# Patient Record
Sex: FEMALE | Race: WHITE | Employment: OTHER | URBAN - METROPOLITAN AREA
[De-identification: names, ages, dates, MRNs, and addresses within clinical notes are randomized per-mention and may not be internally consistent; named-entity substitution may affect disease eponyms.]

---

## 2017-01-03 DIAGNOSIS — Z79.899 ON AMIODARONE THERAPY: Primary | ICD-10-CM

## 2017-01-11 ENCOUNTER — PROCEDURE VISIT (OUTPATIENT)
Dept: CARDIOLOGY CLINIC | Age: 82
End: 2017-01-11

## 2017-01-11 ENCOUNTER — OFFICE VISIT (OUTPATIENT)
Dept: CARDIOLOGY CLINIC | Age: 82
End: 2017-01-11

## 2017-01-11 VITALS
BODY MASS INDEX: 24.45 KG/M2 | HEIGHT: 63 IN | SYSTOLIC BLOOD PRESSURE: 147 MMHG | HEART RATE: 90 BPM | WEIGHT: 138 LBS | DIASTOLIC BLOOD PRESSURE: 87 MMHG

## 2017-01-11 DIAGNOSIS — I35.0 NONRHEUMATIC AORTIC VALVE STENOSIS: ICD-10-CM

## 2017-01-11 DIAGNOSIS — I47.20 VENTRICULAR TACHYCARDIA: ICD-10-CM

## 2017-01-11 DIAGNOSIS — I42.1 HOCM (HYPERTROPHIC OBSTRUCTIVE CARDIOMYOPATHY) (HCC): ICD-10-CM

## 2017-01-11 DIAGNOSIS — Z95.810 CARDIAC DEFIBRILLATOR IN PLACE: Primary | ICD-10-CM

## 2017-01-11 DIAGNOSIS — R55 SYNCOPE AND COLLAPSE: Primary | ICD-10-CM

## 2017-01-11 DIAGNOSIS — R55 SYNCOPE AND COLLAPSE: ICD-10-CM

## 2017-01-11 PROCEDURE — 99214 OFFICE O/P EST MOD 30 MIN: CPT | Performed by: INTERNAL MEDICINE

## 2017-01-11 PROCEDURE — 93283 PRGRMG EVAL IMPLANTABLE DFB: CPT | Performed by: INTERNAL MEDICINE

## 2017-02-03 ENCOUNTER — OFFICE VISIT (OUTPATIENT)
Dept: CARDIOLOGY CLINIC | Age: 82
End: 2017-02-03

## 2017-02-03 VITALS — SYSTOLIC BLOOD PRESSURE: 150 MMHG | OXYGEN SATURATION: 96 % | HEART RATE: 85 BPM | DIASTOLIC BLOOD PRESSURE: 90 MMHG

## 2017-02-03 DIAGNOSIS — Z95.810 CARDIAC DEFIBRILLATOR IN PLACE: ICD-10-CM

## 2017-02-03 DIAGNOSIS — I35.0 NONRHEUMATIC AORTIC VALVE STENOSIS: ICD-10-CM

## 2017-02-03 DIAGNOSIS — I11.0 HYPERTENSIVE HEART DISEASE WITH HEART FAILURE (HCC): ICD-10-CM

## 2017-02-03 DIAGNOSIS — I42.1 HOCM (HYPERTROPHIC OBSTRUCTIVE CARDIOMYOPATHY) (HCC): Primary | ICD-10-CM

## 2017-02-03 PROCEDURE — 1036F TOBACCO NON-USER: CPT | Performed by: INTERNAL MEDICINE

## 2017-02-03 PROCEDURE — G8400 PT W/DXA NO RESULTS DOC: HCPCS | Performed by: INTERNAL MEDICINE

## 2017-02-03 PROCEDURE — G8427 DOCREV CUR MEDS BY ELIG CLIN: HCPCS | Performed by: INTERNAL MEDICINE

## 2017-02-03 PROCEDURE — 1090F PRES/ABSN URINE INCON ASSESS: CPT | Performed by: INTERNAL MEDICINE

## 2017-02-03 PROCEDURE — G8420 CALC BMI NORM PARAMETERS: HCPCS | Performed by: INTERNAL MEDICINE

## 2017-02-03 PROCEDURE — 99214 OFFICE O/P EST MOD 30 MIN: CPT | Performed by: INTERNAL MEDICINE

## 2017-02-03 PROCEDURE — 1123F ACP DISCUSS/DSCN MKR DOCD: CPT | Performed by: INTERNAL MEDICINE

## 2017-02-03 PROCEDURE — 4040F PNEUMOC VAC/ADMIN/RCVD: CPT | Performed by: INTERNAL MEDICINE

## 2017-02-03 PROCEDURE — G8484 FLU IMMUNIZE NO ADMIN: HCPCS | Performed by: INTERNAL MEDICINE

## 2017-02-03 RX ORDER — METOPROLOL TARTRATE 50 MG/1
75 TABLET, FILM COATED ORAL 2 TIMES DAILY
Qty: 60 TABLET | Refills: 3 | Status: SHIPPED | OUTPATIENT
Start: 2017-02-03 | End: 2017-05-23 | Stop reason: SDUPTHER

## 2017-02-03 RX ORDER — LISINOPRIL 2.5 MG/1
2.5 TABLET ORAL DAILY
Qty: 90 TABLET | Refills: 3 | Status: SHIPPED | OUTPATIENT
Start: 2017-02-03 | End: 2018-01-10 | Stop reason: SDUPTHER

## 2017-02-22 RX ORDER — ASPIRIN 81 MG
TABLET, DELAYED RELEASE (ENTERIC COATED) ORAL
Qty: 30 TABLET | Refills: 5 | Status: SHIPPED | OUTPATIENT
Start: 2017-02-22 | End: 2017-08-14 | Stop reason: SDUPTHER

## 2017-03-28 LAB
ALBUMIN SERPL-MCNC: 4 G/DL
ALP BLD-CCNC: 107 U/L
ALT SERPL-CCNC: 27 U/L
AST SERPL-CCNC: 25 U/L
BILIRUB SERPL-MCNC: 0.7 MG/DL (ref 0.1–1.4)
BUN BLDV-MCNC: 20 MG/DL
CALCIUM SERPL-MCNC: 9.3 MG/DL
CHLORIDE BLD-SCNC: 100 MMOL/L
CO2: 26 MMOL/L
CREAT SERPL-MCNC: 1.02 MG/DL
GFR CALCULATED: NORMAL
GLUCOSE BLD-MCNC: 77 MG/DL
POTASSIUM SERPL-SCNC: 4.4 MMOL/L
SODIUM BLD-SCNC: 137 MMOL/L
TOTAL PROTEIN: 6.6
TSH SERPL DL<=0.05 MIU/L-ACNC: 2.24 UIU/ML

## 2017-05-09 ENCOUNTER — NURSE ONLY (OUTPATIENT)
Dept: CARDIOLOGY CLINIC | Age: 82
End: 2017-05-09

## 2017-05-09 DIAGNOSIS — Z95.810 CARDIAC DEFIBRILLATOR IN PLACE: Primary | ICD-10-CM

## 2017-05-09 DIAGNOSIS — I47.20 VENTRICULAR TACHYCARDIA: ICD-10-CM

## 2017-05-09 PROCEDURE — 93295 DEV INTERROG REMOTE 1/2/MLT: CPT | Performed by: INTERNAL MEDICINE

## 2017-05-09 PROCEDURE — 93296 REM INTERROG EVL PM/IDS: CPT | Performed by: INTERNAL MEDICINE

## 2017-05-23 RX ORDER — METOPROLOL TARTRATE 50 MG/1
TABLET, FILM COATED ORAL
Qty: 90 TABLET | Refills: 2 | Status: SHIPPED | OUTPATIENT
Start: 2017-05-23 | End: 2017-08-14 | Stop reason: SDUPTHER

## 2017-06-02 ENCOUNTER — OFFICE VISIT (OUTPATIENT)
Dept: CARDIOLOGY CLINIC | Age: 82
End: 2017-06-02

## 2017-06-02 VITALS
BODY MASS INDEX: 25.69 KG/M2 | OXYGEN SATURATION: 95 % | HEIGHT: 63 IN | HEART RATE: 84 BPM | SYSTOLIC BLOOD PRESSURE: 130 MMHG | DIASTOLIC BLOOD PRESSURE: 84 MMHG | WEIGHT: 145 LBS

## 2017-06-02 DIAGNOSIS — I42.1 HOCM (HYPERTROPHIC OBSTRUCTIVE CARDIOMYOPATHY) (HCC): Primary | ICD-10-CM

## 2017-06-02 DIAGNOSIS — I35.0 NONRHEUMATIC AORTIC VALVE STENOSIS: ICD-10-CM

## 2017-06-02 DIAGNOSIS — I11.0 HYPERTENSIVE HEART DISEASE WITH HEART FAILURE (HCC): ICD-10-CM

## 2017-06-02 DIAGNOSIS — Z95.810 CARDIAC DEFIBRILLATOR IN PLACE: ICD-10-CM

## 2017-06-02 PROCEDURE — G8400 PT W/DXA NO RESULTS DOC: HCPCS | Performed by: INTERNAL MEDICINE

## 2017-06-02 PROCEDURE — 4040F PNEUMOC VAC/ADMIN/RCVD: CPT | Performed by: INTERNAL MEDICINE

## 2017-06-02 PROCEDURE — 1036F TOBACCO NON-USER: CPT | Performed by: INTERNAL MEDICINE

## 2017-06-02 PROCEDURE — G8427 DOCREV CUR MEDS BY ELIG CLIN: HCPCS | Performed by: INTERNAL MEDICINE

## 2017-06-02 PROCEDURE — 99214 OFFICE O/P EST MOD 30 MIN: CPT | Performed by: INTERNAL MEDICINE

## 2017-06-02 PROCEDURE — 1123F ACP DISCUSS/DSCN MKR DOCD: CPT | Performed by: INTERNAL MEDICINE

## 2017-06-02 PROCEDURE — 1090F PRES/ABSN URINE INCON ASSESS: CPT | Performed by: INTERNAL MEDICINE

## 2017-06-02 PROCEDURE — G8420 CALC BMI NORM PARAMETERS: HCPCS | Performed by: INTERNAL MEDICINE

## 2017-06-02 RX ORDER — LATANOPROST 50 UG/ML
SOLUTION/ DROPS OPHTHALMIC
Refills: 6 | COMMUNITY
Start: 2017-05-14

## 2017-08-14 RX ORDER — METOPROLOL TARTRATE 50 MG/1
TABLET, FILM COATED ORAL
Qty: 90 TABLET | Refills: 5 | Status: SHIPPED | OUTPATIENT
Start: 2017-08-14 | End: 2018-02-07 | Stop reason: SDUPTHER

## 2017-08-14 RX ORDER — ASPIRIN 81 MG
TABLET, DELAYED RELEASE (ENTERIC COATED) ORAL
Qty: 30 TABLET | Refills: 11 | Status: SHIPPED | OUTPATIENT
Start: 2017-08-14 | End: 2018-08-05 | Stop reason: SDUPTHER

## 2017-08-15 ENCOUNTER — NURSE ONLY (OUTPATIENT)
Dept: CARDIOLOGY CLINIC | Age: 82
End: 2017-08-15

## 2017-08-15 DIAGNOSIS — Z95.810 CARDIAC DEFIBRILLATOR IN PLACE: Primary | ICD-10-CM

## 2017-08-15 DIAGNOSIS — I47.20 VENTRICULAR TACHYCARDIA: ICD-10-CM

## 2017-08-15 PROCEDURE — 93296 REM INTERROG EVL PM/IDS: CPT | Performed by: INTERNAL MEDICINE

## 2017-08-15 PROCEDURE — 93295 DEV INTERROG REMOTE 1/2/MLT: CPT | Performed by: INTERNAL MEDICINE

## 2017-08-18 ENCOUNTER — TELEPHONE (OUTPATIENT)
Dept: CARDIOLOGY CLINIC | Age: 82
End: 2017-08-18

## 2017-09-06 ENCOUNTER — OFFICE VISIT (OUTPATIENT)
Dept: CARDIOLOGY CLINIC | Age: 82
End: 2017-09-06

## 2017-09-06 ENCOUNTER — PROCEDURE VISIT (OUTPATIENT)
Dept: CARDIOLOGY CLINIC | Age: 82
End: 2017-09-06

## 2017-09-06 VITALS
OXYGEN SATURATION: 95 % | WEIGHT: 147 LBS | DIASTOLIC BLOOD PRESSURE: 70 MMHG | HEART RATE: 85 BPM | HEIGHT: 63 IN | SYSTOLIC BLOOD PRESSURE: 136 MMHG | BODY MASS INDEX: 26.05 KG/M2

## 2017-09-06 DIAGNOSIS — I50.22 CHF (CONGESTIVE HEART FAILURE), NYHA CLASS I, CHRONIC, SYSTOLIC (HCC): Primary | ICD-10-CM

## 2017-09-06 DIAGNOSIS — I42.1 HOCM (HYPERTROPHIC OBSTRUCTIVE CARDIOMYOPATHY) (HCC): Primary | ICD-10-CM

## 2017-09-06 DIAGNOSIS — I10 ESSENTIAL HYPERTENSION: ICD-10-CM

## 2017-09-06 DIAGNOSIS — I47.20 VENTRICULAR TACHYCARDIA: ICD-10-CM

## 2017-09-06 DIAGNOSIS — Z95.810 CARDIAC DEFIBRILLATOR IN PLACE: ICD-10-CM

## 2017-09-06 DIAGNOSIS — I11.0 HYPERTENSIVE HEART DISEASE WITH HEART FAILURE (HCC): ICD-10-CM

## 2017-09-06 PROCEDURE — 1090F PRES/ABSN URINE INCON ASSESS: CPT | Performed by: INTERNAL MEDICINE

## 2017-09-06 PROCEDURE — 1123F ACP DISCUSS/DSCN MKR DOCD: CPT | Performed by: INTERNAL MEDICINE

## 2017-09-06 PROCEDURE — G8427 DOCREV CUR MEDS BY ELIG CLIN: HCPCS | Performed by: INTERNAL MEDICINE

## 2017-09-06 PROCEDURE — 93290 INTERROG DEV EVAL ICPMS IP: CPT | Performed by: INTERNAL MEDICINE

## 2017-09-06 PROCEDURE — 4040F PNEUMOC VAC/ADMIN/RCVD: CPT | Performed by: INTERNAL MEDICINE

## 2017-09-06 PROCEDURE — G8417 CALC BMI ABV UP PARAM F/U: HCPCS | Performed by: INTERNAL MEDICINE

## 2017-09-06 PROCEDURE — 1036F TOBACCO NON-USER: CPT | Performed by: INTERNAL MEDICINE

## 2017-09-06 PROCEDURE — 99214 OFFICE O/P EST MOD 30 MIN: CPT | Performed by: INTERNAL MEDICINE

## 2017-09-06 PROCEDURE — G8400 PT W/DXA NO RESULTS DOC: HCPCS | Performed by: INTERNAL MEDICINE

## 2017-09-06 RX ORDER — HYDROCHLOROTHIAZIDE 25 MG/1
25 TABLET ORAL DAILY
Qty: 30 TABLET | Refills: 11 | Status: SHIPPED | OUTPATIENT
Start: 2017-09-06 | End: 2018-09-06 | Stop reason: SDUPTHER

## 2017-10-10 LAB
ALBUMIN SERPL-MCNC: 3.9 G/DL
ALP BLD-CCNC: 110 U/L
ALT SERPL-CCNC: 32 U/L
ANION GAP SERPL CALCULATED.3IONS-SCNC: NORMAL MMOL/L
AST SERPL-CCNC: 32 U/L
B-TYPE NATRIURETIC PEPTIDE: 123 PG/ML
BASOPHILS ABSOLUTE: NORMAL /ΜL
BASOPHILS RELATIVE PERCENT: NORMAL %
BILIRUB SERPL-MCNC: 0.6 MG/DL (ref 0.1–1.4)
BUN BLDV-MCNC: 21 MG/DL
CALCIUM SERPL-MCNC: 9 MG/DL
CHLORIDE BLD-SCNC: 103 MMOL/L
CHOLESTEROL, TOTAL: 131 MG/DL
CHOLESTEROL/HDL RATIO: NORMAL
CO2: 27 MMOL/L
CREAT SERPL-MCNC: 1.1 MG/DL
EOSINOPHILS ABSOLUTE: NORMAL /ΜL
EOSINOPHILS RELATIVE PERCENT: NORMAL %
GFR CALCULATED: NORMAL
GLUCOSE BLD-MCNC: 109 MG/DL
HCT VFR BLD CALC: 44 % (ref 36–46)
HDLC SERPL-MCNC: 35 MG/DL (ref 35–70)
HEMOGLOBIN: 14.2 G/DL (ref 12–16)
LDL CHOLESTEROL CALCULATED: 82 MG/DL (ref 0–160)
LYMPHOCYTES ABSOLUTE: NORMAL /ΜL
LYMPHOCYTES RELATIVE PERCENT: NORMAL %
MCH RBC QN AUTO: 31 PG
MCHC RBC AUTO-ENTMCNC: 33 G/DL
MCV RBC AUTO: 95 FL
MONOCYTES ABSOLUTE: NORMAL /ΜL
MONOCYTES RELATIVE PERCENT: NORMAL %
NEUTROPHILS ABSOLUTE: NORMAL /ΜL
NEUTROPHILS RELATIVE PERCENT: NORMAL %
PDW BLD-RTO: 14.4 %
PLATELET # BLD: 191 K/ΜL
PMV BLD AUTO: NORMAL FL
POTASSIUM SERPL-SCNC: 3.9 MMOL/L
RBC # BLD: 4.59 10^6/ΜL
SODIUM BLD-SCNC: 136 MMOL/L
TOTAL PROTEIN: 6.3
TRIGL SERPL-MCNC: 72 MG/DL
VLDLC SERPL CALC-MCNC: NORMAL MG/DL
WBC # BLD: 10.2 10^3/ML

## 2017-11-06 ENCOUNTER — PROCEDURE VISIT (OUTPATIENT)
Dept: CARDIOLOGY CLINIC | Age: 82
End: 2017-11-06

## 2017-11-06 DIAGNOSIS — Z95.810 CARDIAC DEFIBRILLATOR IN PLACE: Primary | ICD-10-CM

## 2017-11-06 DIAGNOSIS — I50.22 CHF (CONGESTIVE HEART FAILURE), NYHA CLASS I, CHRONIC, SYSTOLIC (HCC): ICD-10-CM

## 2017-11-06 NOTE — PROGRESS NOTES
See PACEART report under Cardiology tab. UNSCHEDULED Carelink transmission shows normal functioning device. Since 8/15/17 she has had 9 new NSVT episodes recorded lasting 1-3 seconds. She is on amio and Lopressor. OPTIVOL TRENDING UP SINCE 11/1/17.

## 2017-11-15 DIAGNOSIS — Z79.899 ON AMIODARONE THERAPY: Primary | ICD-10-CM

## 2017-11-28 ENCOUNTER — NURSE ONLY (OUTPATIENT)
Dept: CARDIOLOGY CLINIC | Age: 82
End: 2017-11-28

## 2017-11-28 DIAGNOSIS — Z95.810 CARDIAC DEFIBRILLATOR IN PLACE: Primary | ICD-10-CM

## 2017-11-28 DIAGNOSIS — I47.20 VENTRICULAR TACHYCARDIA: ICD-10-CM

## 2017-11-28 PROCEDURE — 93296 REM INTERROG EVL PM/IDS: CPT | Performed by: INTERNAL MEDICINE

## 2017-11-28 PROCEDURE — 93295 DEV INTERROG REMOTE 1/2/MLT: CPT | Performed by: INTERNAL MEDICINE

## 2017-11-29 ENCOUNTER — OFFICE VISIT (OUTPATIENT)
Dept: CARDIOLOGY CLINIC | Age: 82
End: 2017-11-29

## 2017-11-29 ENCOUNTER — PROCEDURE VISIT (OUTPATIENT)
Dept: CARDIOLOGY CLINIC | Age: 82
End: 2017-11-29

## 2017-11-29 ENCOUNTER — HOSPITAL ENCOUNTER (OUTPATIENT)
Dept: CARDIOLOGY | Facility: CLINIC | Age: 82
Discharge: HOME | End: 2017-11-29
Attending: INTERNAL MEDICINE | Admitting: INTERNAL MEDICINE

## 2017-11-29 VITALS
DIASTOLIC BLOOD PRESSURE: 74 MMHG | SYSTOLIC BLOOD PRESSURE: 130 MMHG | BODY MASS INDEX: 26.75 KG/M2 | HEIGHT: 63 IN | WEIGHT: 151 LBS

## 2017-11-29 DIAGNOSIS — I50.22 CHF (CONGESTIVE HEART FAILURE), NYHA CLASS I, CHRONIC, SYSTOLIC (HCC): ICD-10-CM

## 2017-11-29 DIAGNOSIS — I42.1 HOCM (HYPERTROPHIC OBSTRUCTIVE CARDIOMYOPATHY) (HCC): Primary | ICD-10-CM

## 2017-11-29 DIAGNOSIS — I47.20 VENTRICULAR TACHYCARDIA: ICD-10-CM

## 2017-11-29 DIAGNOSIS — Z95.810 CARDIAC DEFIBRILLATOR IN PLACE: ICD-10-CM

## 2017-11-29 DIAGNOSIS — I10 ESSENTIAL HYPERTENSION: ICD-10-CM

## 2017-11-29 DIAGNOSIS — Z95.810 CARDIAC DEFIBRILLATOR IN PLACE: Primary | ICD-10-CM

## 2017-11-29 DIAGNOSIS — E78.5 HYPERLIPIDEMIA, UNSPECIFIED HYPERLIPIDEMIA TYPE: ICD-10-CM

## 2017-11-29 DIAGNOSIS — Z79.899 ON AMIODARONE THERAPY: ICD-10-CM

## 2017-11-29 DIAGNOSIS — I11.0 HYPERTENSIVE HEART DISEASE WITH HEART FAILURE (HCC): ICD-10-CM

## 2017-11-29 DIAGNOSIS — E55.9 VITAMIN D DEFICIENCY: ICD-10-CM

## 2017-11-29 DIAGNOSIS — R53.83 FATIGUE, UNSPECIFIED TYPE: ICD-10-CM

## 2017-11-29 LAB
LV EF: 68 %
LVEF MODALITY: NORMAL

## 2017-11-29 PROCEDURE — 93000 ELECTROCARDIOGRAM COMPLETE: CPT | Performed by: INTERNAL MEDICINE

## 2017-11-29 PROCEDURE — 1123F ACP DISCUSS/DSCN MKR DOCD: CPT | Performed by: INTERNAL MEDICINE

## 2017-11-29 PROCEDURE — 4040F PNEUMOC VAC/ADMIN/RCVD: CPT | Performed by: INTERNAL MEDICINE

## 2017-11-29 PROCEDURE — 93290 INTERROG DEV EVAL ICPMS IP: CPT | Performed by: INTERNAL MEDICINE

## 2017-11-29 PROCEDURE — G8417 CALC BMI ABV UP PARAM F/U: HCPCS | Performed by: INTERNAL MEDICINE

## 2017-11-29 PROCEDURE — G8400 PT W/DXA NO RESULTS DOC: HCPCS | Performed by: INTERNAL MEDICINE

## 2017-11-29 PROCEDURE — 1036F TOBACCO NON-USER: CPT | Performed by: INTERNAL MEDICINE

## 2017-11-29 PROCEDURE — G8427 DOCREV CUR MEDS BY ELIG CLIN: HCPCS | Performed by: INTERNAL MEDICINE

## 2017-11-29 PROCEDURE — 99214 OFFICE O/P EST MOD 30 MIN: CPT | Performed by: INTERNAL MEDICINE

## 2017-11-29 PROCEDURE — G8484 FLU IMMUNIZE NO ADMIN: HCPCS | Performed by: INTERNAL MEDICINE

## 2017-11-29 PROCEDURE — 1090F PRES/ABSN URINE INCON ASSESS: CPT | Performed by: INTERNAL MEDICINE

## 2017-11-29 RX ORDER — BACLOFEN 10 MG/1
10 TABLET ORAL 3 TIMES DAILY
COMMUNITY
End: 2018-01-24 | Stop reason: CLARIF

## 2017-11-29 NOTE — PROGRESS NOTES
See PACEART report under Cardiology tab. Carelink transmission shows normal functioning device. 2 new NSVT events recorded in the last 3 months  Follow up in 3 months via carelink. OV ROLF 11/29 with Optivol.

## 2017-11-29 NOTE — PROGRESS NOTES
tablet Take 10 mg by mouth 3 times daily      amiodarone (CORDARONE) 200 MG tablet TAKE 1 TABLET BY MOUTH EVERY DAY 30 tablet 0    hydrochlorothiazide (HYDRODIURIL) 25 MG tablet Take 1 tablet by mouth daily Take for 4 days in a row the first week, then take twice a week after that 30 tablet 11    ASPIRIN LOW DOSE 81 MG EC tablet TAKE 1 TABLET BY MOUTH EVERY DAY 30 tablet 11    metoprolol tartrate (LOPRESSOR) 50 MG tablet TAKE 1& 1/2 TABLETS BY MOUTH TWICE DAILY 90 tablet 5    latanoprost (XALATAN) 0.005 % ophthalmic solution INSTILL 1 DROP IN BOTH EYES HS  6    lisinopril (PRINIVIL;ZESTRIL) 2.5 MG tablet Take 1 tablet by mouth daily 90 tablet 3    verapamil (CALAN) 120 MG tablet Take 0.5 tablets by mouth 2 times daily 30 tablet 3    sertraline (ZOLOFT) 100 MG tablet Take 150 mg by mouth daily      atorvastatin (LIPITOR) 10 MG tablet Take 10 mg by mouth daily       No current facility-administered medications for this visit. Past Medical History:   Diagnosis Date    Anxiety     Eye problem     Hyperlipidemia     Hypertension     Syncope 09/04/2016     Past Surgical History:   Procedure Laterality Date    APPENDECTOMY      CARDIAC CATHETERIZATION  09/06/2016    Non Obs CAD    HAND SURGERY Right     HEMORRHOID SURGERY      UPPER GASTROINTESTINAL ENDOSCOPY  09/09/2016     History reviewed. No pertinent family history. Social History     Social History    Marital status: Single     Spouse name: N/A    Number of children: N/A    Years of education: N/A     Occupational History    Not on file. Social History Main Topics    Smoking status: Former Smoker    Smokeless tobacco: Never Used    Alcohol use No    Drug use: No    Sexual activity: Not on file     Other Topics Concern    Not on file     Social History Narrative    No narrative on file       Review of Systems:   · Constitutional: there has been no unanticipated weight loss.  There's been no change in energy level, sleep pattern, or activity level. · Eyes: No visual changes or diplopia. No scleral icterus. · ENT: No Headaches, hearing loss or vertigo. No mouth sores or sore throat. · Cardiovascular: Reviewed in HPI  · Respiratory: No cough or wheezing, no sputum production. No hematemesis. · Gastrointestinal: No abdominal pain, appetite loss, blood in stools. No change in bowel or bladder habits. · Genitourinary: No dysuria, trouble voiding, or hematuria. · Musculoskeletal:  No gait disturbance, weakness or joint complaints. · Integumentary: No rash or pruritis. · Neurological: No headache, diplopia, change in muscle strength, numbness or tingling. No change in gait, balance, coordination, mood, affect, memory, mentation, behavior. · Psychiatric: No anxiety, no depression. · Endocrine: No malaise, fatigue or temperature intolerance. No excessive thirst, fluid intake, or urination. No tremor. · Hematologic/Lymphatic: No abnormal bruising or bleeding, blood clots or swollen lymph nodes. · Allergic/Immunologic: No nasal congestion or hives. Physical Examination:    Vitals:    11/29/17 1511   BP: 130/74   Weight: 151 lb (68.5 kg)   Height: 5' 3\" (1.6 m)     Body mass index is 26.75 kg/m².      Wt Readings from Last 3 Encounters:   11/29/17 151 lb (68.5 kg)   09/06/17 147 lb (66.7 kg)   06/02/17 145 lb (65.8 kg)     BP Readings from Last 3 Encounters:   11/29/17 130/74   09/06/17 136/70   06/02/17 130/84     Constitutional and General Appearance:   WD/WN in NAD  HEENT:  NC/AT  Skin:  Warm, dry  Respiratory:  · Normal excursion and expansion without use of accessory muscles  · Resp Auscultation: Normal breath sounds without dullness  Cardiovascular:  · The apical impulses not displaced  · Heart tones are crisp and normal  · Cervical veins are not engorged  · The carotid upstroke is normal in amplitude and contour without delay or bruit  · JVP less than 8 cm H2O  RRR with nl S1 and S2 without m,r,g  · Peripheral pulses dual LV/Ao pressure study due to VT/hypotension  4. Will consult EP for VT. Assessment/Plan:    1. HOCM (hypertrophic obstructive cardiomyopathy) ([facility] [address]    2. On amiodarone therapy    3. Cardiac defibrillator in place    4. Hypertensive heart disease with heart failure ([facility] [address]    5. Essential hypertension    6. Hyperlipidemia, unspecified hyperlipidemia type    7. Ventricular tachycardia ([facility] [address]     5. Hypertension:  uncontrolled      Plan:   1. TSH and Vit D in the next few weeks  2. Follow up in 6 months      QUALITY MEASURES  1. Tobacco Cessation Counseling: NA  2. Retake of BP if >140/90:   YES  3. Documentation to PCP/referring for new patient:  Sent to PCP at close of office visit  4. CAD patient on anti-platelet: NA  5. CAD patient on STATIN therapy:  NA  6. Patient with CHF and aFib on anticoagulation:  Yes            I appreciate the opportunity of cooperating in the care of this patient.     Cheli Palomino M.D., [facility]

## 2017-11-30 NOTE — COMMUNICATION BODY
Assessment/Plan:    1. HOCM (hypertrophic obstructive cardiomyopathy) ([facility] [address]    2. On amiodarone therapy    3. Cardiac defibrillator in place    4. Hypertensive heart disease with heart failure ([facility] [address]    5. Essential hypertension    6. Hyperlipidemia, unspecified hyperlipidemia type    7. Ventricular tachycardia ([facility]     5. Hypertension:  uncontrolled        Plan:   1. TSH and Vit D in the next few weeks  2.  Follow up in 6 months

## 2017-12-05 LAB
TSH ULTRASENSITIVE: 2.76 MIU/L (ref 0.45–5.33)
VITAMIN D 25-HYDROXY: 16 NG/ML

## 2017-12-27 ENCOUNTER — TELEPHONE (OUTPATIENT)
Dept: CARDIOLOGY CLINIC | Age: 82
End: 2017-12-27

## 2017-12-27 RX ORDER — ERGOCALCIFEROL 1.25 MG/1
CAPSULE ORAL
Refills: 1 | COMMUNITY
Start: 2017-12-10 | End: 2017-12-27 | Stop reason: SDUPTHER

## 2017-12-27 RX ORDER — ERGOCALCIFEROL 1.25 MG/1
50000 CAPSULE ORAL WEEKLY
Qty: 30 CAPSULE | Refills: 0 | Status: SHIPPED | OUTPATIENT
Start: 2017-12-27 | End: 2018-05-29

## 2018-01-10 RX ORDER — LISINOPRIL 2.5 MG/1
TABLET ORAL
Qty: 90 TABLET | Refills: 0 | Status: SHIPPED | OUTPATIENT
Start: 2018-01-10 | End: 2018-04-10 | Stop reason: SDUPTHER

## 2018-01-24 ENCOUNTER — PROCEDURE VISIT (OUTPATIENT)
Dept: CARDIOLOGY CLINIC | Age: 83
End: 2018-01-24

## 2018-01-24 ENCOUNTER — OFFICE VISIT (OUTPATIENT)
Dept: CARDIOLOGY CLINIC | Age: 83
End: 2018-01-24

## 2018-01-24 VITALS
HEIGHT: 62 IN | SYSTOLIC BLOOD PRESSURE: 112 MMHG | HEART RATE: 92 BPM | BODY MASS INDEX: 28.16 KG/M2 | WEIGHT: 153 LBS | DIASTOLIC BLOOD PRESSURE: 72 MMHG

## 2018-01-24 DIAGNOSIS — I47.20 VENTRICULAR TACHYCARDIA: ICD-10-CM

## 2018-01-24 DIAGNOSIS — R55 SYNCOPE AND COLLAPSE: ICD-10-CM

## 2018-01-24 DIAGNOSIS — Z95.810 CARDIAC DEFIBRILLATOR IN PLACE: Primary | ICD-10-CM

## 2018-01-24 DIAGNOSIS — Z95.810 CARDIAC DEFIBRILLATOR IN PLACE: ICD-10-CM

## 2018-01-24 DIAGNOSIS — I42.1 HOCM (HYPERTROPHIC OBSTRUCTIVE CARDIOMYOPATHY) (HCC): Primary | ICD-10-CM

## 2018-01-24 PROCEDURE — G8400 PT W/DXA NO RESULTS DOC: HCPCS | Performed by: INTERNAL MEDICINE

## 2018-01-24 PROCEDURE — 1090F PRES/ABSN URINE INCON ASSESS: CPT | Performed by: INTERNAL MEDICINE

## 2018-01-24 PROCEDURE — 4040F PNEUMOC VAC/ADMIN/RCVD: CPT | Performed by: INTERNAL MEDICINE

## 2018-01-24 PROCEDURE — 99214 OFFICE O/P EST MOD 30 MIN: CPT | Performed by: INTERNAL MEDICINE

## 2018-01-24 PROCEDURE — 1036F TOBACCO NON-USER: CPT | Performed by: INTERNAL MEDICINE

## 2018-01-24 PROCEDURE — G8427 DOCREV CUR MEDS BY ELIG CLIN: HCPCS | Performed by: INTERNAL MEDICINE

## 2018-01-24 PROCEDURE — G8417 CALC BMI ABV UP PARAM F/U: HCPCS | Performed by: INTERNAL MEDICINE

## 2018-01-24 PROCEDURE — G8484 FLU IMMUNIZE NO ADMIN: HCPCS | Performed by: INTERNAL MEDICINE

## 2018-01-24 PROCEDURE — 1123F ACP DISCUSS/DSCN MKR DOCD: CPT | Performed by: INTERNAL MEDICINE

## 2018-01-24 PROCEDURE — 93283 PRGRMG EVAL IMPLANTABLE DFB: CPT | Performed by: INTERNAL MEDICINE

## 2018-01-24 NOTE — PATIENT INSTRUCTIONS
RECOMMENDATIONS:  1. Follow up with the device clinic as scheduled  2. Follow up with Dr Ondina Delong as scheduled  3. Increase activity. Goal is to walk up to one mile  4.  Follow up with Wang Anton NP in 1 year

## 2018-01-24 NOTE — PROGRESS NOTES
numbness or tingling. No change in gait, balance, coordination, mood, affect, memory, mentation, behavior. · Psychiatric: No anxiety, or depression. · Endocrine: No temperature intolerance. No excessive thirst, fluid intake, or urination. No tremor. · Hematologic/Lymphatic: No abnormal bruising or bleeding, blood clots or swollen lymph nodes. · Allergic/Immunologic: No nasal congestion or hives. Physical Examination:    /72   Pulse 92   Ht 5' 2\" (1.575 m)   Wt 153 lb (69.4 kg)   BMI 27.98 kg/m²    Constitutional and General Appearance: alert, cooperative, no distress and appears stated age  [de-identified]: PERRL, no cervical lymphadenopathy. No masses palpable. Normal oral mucosa  Respiratory:  · Normal excursion and expansion without use of accessory muscles  · Resp Auscultation: Normal breath sounds without dullness or wheezing  Cardiovascular:  · The apical impulse is not displaced  · Heart tones are crisp and normal. regular S1 and S2.  · Jugular venous pulsation Normal  · The carotid upstroke is normal in amplitude and contour without delay or bruit  · Peripheral pulses are symmetrical and full  Abdomen:  · No masses or tenderness  · Bowel sounds present  Extremities:  ·  No Cyanosis or Clubbing  ·  Lower extremity edema: No  · Skin: Warm and dry  Neurological:  · Alert and oriented. · Moves all extremities well  · No abnormalities of mood, affect, memory, mentation, or behavior are noted      DATA:    ECG:    ECHO: 09/03/16   Summary   Normal left ventricle size,normal systolic function with an estimated   ejection fraction of 55-60%. No regional wall abnormalites are seen. Mild concentric left ventricular hypertrophy. Diastolic filling parameters suggests grade I diastolic dysfunction . Mild to moderate Mitral regurgitation. Aortic valve is thick and calcified. The aortic valve area is calculated at 1.7 cm2 with peak velocity 40 m/sec   and a mean pressure gradient of 19 mmhg.    This c/w

## 2018-02-07 RX ORDER — METOPROLOL TARTRATE 50 MG/1
TABLET, FILM COATED ORAL
Qty: 90 TABLET | Refills: 11 | Status: SHIPPED | OUTPATIENT
Start: 2018-02-07 | End: 2019-01-01 | Stop reason: SDUPTHER

## 2018-02-11 NOTE — COMMUNICATION BODY
[address]   Electrophysiology Note      Reason for office visit: 1 Year Follow Up      HISTORY OF PRESENT ILLNESS: Asia Ricardo is a 80 y.o. female who presents for follow up for hypertrophic obstructive cardiomyopathy and syncope. On 09/06/16 she was admitted to the hospital for unexplained syncope, HOCM and Non sustained ventricular tachycardia. Patient presented with unexplained syncope and was witnessed to have symptomatic palpitations with Non sustained ventricular tachycardia. Patient has long history of both symptomatic and asymptomatic syncope. She was with her daughter at [employer] when she had unexplained syncope. She underwent MRI compatible dual chamber ICD on 09/07/11 for primary prevention. She has not had a syncopal episode since September 2016. Interval history since last office visit:   Today she reports she has increased SOB with exertion. She has occasional dizziness. She denies SOB, CP, palpitations, or syncope. She does not exercise and is not active. Past Medical History:   has a past medical history of Anxiety; Eye problem; Hyperlipidemia; Hypertension; and Syncope. Past Surgical History:   has a past surgical history that includes Hand surgery (Right); Appendectomy; Hemorrhoid surgery; Cardiac catheterization (09/06/2016); and Upper gastrointestinal endoscopy (09/09/2016). Home Medications:    Prior to Admission medications    Medication Sig Start Date End Date Taking?  Authorizing Provider   lisinopril (PRINIVIL;ZESTRIL) 2.5 MG tablet TAKE 1 TABLET BY MOUTH EVERY DAY 1/10/18  Yes Naz Jovel MD   vitamin D (ERGOCALCIFEROL) 94035 units CAPS capsule Take 1 capsule by mouth once a week 12/27/17  Yes Naz Jovel MD   amiodarone (CORDARONE) 200 MG tablet TAKE 1 TABLET BY MOUTH EVERY DAY 12/11/17  Yes Araceli Jordan CNP   hydrochlorothiazide (HYDRODIURIL) 25 MG tablet Take 1 tablet by mouth daily Take for 4 days in a row the first week, then take twice a mild aortic stenosis. There is a late peaking LV outflow tract gradient continuous with dynamic LV   outflow tract obstruction of 767 cm/sec or 236 mm Hg with valsalva. Trace aortic regurgitation . Mild tricuspid regurgitation. Systolic pulmonary artery pressure (SPAP) is normal and estimated at 38 mmHg   (RA pressure 3 mm Hg). [facility]: 09/06/16  LEFT HEART CATH  LM: mild calcification, luminals  LAD: tortuous, long mid LAD myocardial bridge with mild   LCX: tortuous, luminals  RCA: Tortous, angulated takeoff with some catheter induced spasm at ostium      LVEDP: 22-25  LVEF: 65%  No significant MR seen      After PVC: LV systolic pressure did reach 220mmHg  LV: 183/6 (LVEDP 22-25)  Pt did not tolerate LV catheters due to ventricular ectopy which limited study, Not able to due dual pressure study          PLAN  1. No significant CAD (luminals only left main and prox LAD)  2. RCA spasm due to cathether  3. LVEF 65% with inability to perform LV pullback gradient or dual LV/Ao pressure study due to VT/hypotension  4. Will consult EP for VT. IMPRESSION:    1. Syncope and collapse none since ICD implant    2. Nonrheumatic aortic valve stenosis    3. Ventricular tachycardia ([address] s/p Implantable cardioverter defibrillator     4. HOCM (hypertrophic obstructive cardiomyopathy) ([address] followed by Dr. Tyson Costello   Non sustained ventricular tachycardia     RECOMMENDATIONS:  1. Follow up with the device clinic as scheduled  2. Follow up with Dr Tyson Costello as scheduled  3. Increase activity. Goal is to walk up to one mile  4. Follow up with Brianna Payan NP in 1 year      QUALITY MEASURES  1. Tobacco Cessation Counseling: NA  2. Retake of BP if >140/90:   Yes  3. Documentation to PCP/referring for new patient:  Sent to PCP at close of office visit  4. CAD patient on anti-platelet: NA  5. CAD patient on STATIN therapy:  NA  6.  Patient with CHF and aFib on anticoagulation:  NA       All questions and concerns were addressed

## 2018-04-10 RX ORDER — LISINOPRIL 2.5 MG/1
TABLET ORAL
Qty: 90 TABLET | Refills: 1 | Status: SHIPPED | OUTPATIENT
Start: 2018-04-10 | End: 2018-10-05 | Stop reason: SDUPTHER

## 2018-05-01 ENCOUNTER — NURSE ONLY (OUTPATIENT)
Dept: CARDIOLOGY CLINIC | Age: 83
End: 2018-05-01

## 2018-05-01 DIAGNOSIS — I50.22 CHF (CONGESTIVE HEART FAILURE), NYHA CLASS I, CHRONIC, SYSTOLIC (HCC): ICD-10-CM

## 2018-05-01 DIAGNOSIS — I42.1 HOCM (HYPERTROPHIC OBSTRUCTIVE CARDIOMYOPATHY) (HCC): ICD-10-CM

## 2018-05-01 DIAGNOSIS — Z95.810 CARDIAC DEFIBRILLATOR IN PLACE: Primary | ICD-10-CM

## 2018-05-01 DIAGNOSIS — I47.20 VENTRICULAR TACHYCARDIA: ICD-10-CM

## 2018-05-01 PROCEDURE — 93296 REM INTERROG EVL PM/IDS: CPT | Performed by: INTERNAL MEDICINE

## 2018-05-01 PROCEDURE — 93295 DEV INTERROG REMOTE 1/2/MLT: CPT | Performed by: INTERNAL MEDICINE

## 2018-05-01 PROCEDURE — 93297 REM INTERROG DEV EVAL ICPMS: CPT | Performed by: INTERNAL MEDICINE

## 2018-05-29 ENCOUNTER — PROCEDURE VISIT (OUTPATIENT)
Dept: CARDIOLOGY CLINIC | Age: 83
End: 2018-05-29

## 2018-05-29 ENCOUNTER — OFFICE VISIT (OUTPATIENT)
Dept: CARDIOLOGY CLINIC | Age: 83
End: 2018-05-29

## 2018-05-29 VITALS
SYSTOLIC BLOOD PRESSURE: 110 MMHG | HEART RATE: 85 BPM | DIASTOLIC BLOOD PRESSURE: 62 MMHG | HEIGHT: 62 IN | WEIGHT: 153.4 LBS | OXYGEN SATURATION: 94 % | BODY MASS INDEX: 28.23 KG/M2

## 2018-05-29 DIAGNOSIS — I10 ESSENTIAL HYPERTENSION: ICD-10-CM

## 2018-05-29 DIAGNOSIS — I50.22 CHF (CONGESTIVE HEART FAILURE), NYHA CLASS I, CHRONIC, SYSTOLIC (HCC): ICD-10-CM

## 2018-05-29 DIAGNOSIS — E78.00 PURE HYPERCHOLESTEROLEMIA: ICD-10-CM

## 2018-05-29 DIAGNOSIS — I11.0 HYPERTENSIVE HEART DISEASE WITH HEART FAILURE (HCC): Primary | ICD-10-CM

## 2018-05-29 DIAGNOSIS — Z95.810 CARDIAC DEFIBRILLATOR IN PLACE: Primary | ICD-10-CM

## 2018-05-29 DIAGNOSIS — I35.0 NONRHEUMATIC AORTIC VALVE STENOSIS: ICD-10-CM

## 2018-05-29 DIAGNOSIS — I42.1 HOCM (HYPERTROPHIC OBSTRUCTIVE CARDIOMYOPATHY) (HCC): ICD-10-CM

## 2018-05-29 DIAGNOSIS — Z95.810 CARDIAC DEFIBRILLATOR IN PLACE: ICD-10-CM

## 2018-05-29 PROCEDURE — 1090F PRES/ABSN URINE INCON ASSESS: CPT | Performed by: INTERNAL MEDICINE

## 2018-05-29 PROCEDURE — 93290 INTERROG DEV EVAL ICPMS IP: CPT | Performed by: INTERNAL MEDICINE

## 2018-05-29 PROCEDURE — G8400 PT W/DXA NO RESULTS DOC: HCPCS | Performed by: INTERNAL MEDICINE

## 2018-05-29 PROCEDURE — G8417 CALC BMI ABV UP PARAM F/U: HCPCS | Performed by: INTERNAL MEDICINE

## 2018-05-29 PROCEDURE — 1036F TOBACCO NON-USER: CPT | Performed by: INTERNAL MEDICINE

## 2018-05-29 PROCEDURE — G8427 DOCREV CUR MEDS BY ELIG CLIN: HCPCS | Performed by: INTERNAL MEDICINE

## 2018-05-29 PROCEDURE — 99214 OFFICE O/P EST MOD 30 MIN: CPT | Performed by: INTERNAL MEDICINE

## 2018-05-29 PROCEDURE — 4040F PNEUMOC VAC/ADMIN/RCVD: CPT | Performed by: INTERNAL MEDICINE

## 2018-05-29 PROCEDURE — 1123F ACP DISCUSS/DSCN MKR DOCD: CPT | Performed by: INTERNAL MEDICINE

## 2018-06-07 RX ORDER — AMIODARONE HYDROCHLORIDE 200 MG/1
TABLET ORAL
Qty: 90 TABLET | Refills: 3 | Status: SHIPPED | OUTPATIENT
Start: 2018-06-07 | End: 2019-01-01 | Stop reason: SDUPTHER

## 2018-07-31 ENCOUNTER — NURSE ONLY (OUTPATIENT)
Dept: CARDIOLOGY CLINIC | Age: 83
End: 2018-07-31

## 2018-07-31 DIAGNOSIS — I50.22 CHF (CONGESTIVE HEART FAILURE), NYHA CLASS I, CHRONIC, SYSTOLIC (HCC): ICD-10-CM

## 2018-07-31 DIAGNOSIS — I42.1 HOCM (HYPERTROPHIC OBSTRUCTIVE CARDIOMYOPATHY) (HCC): ICD-10-CM

## 2018-07-31 DIAGNOSIS — Z95.810 CARDIAC DEFIBRILLATOR IN PLACE: Primary | ICD-10-CM

## 2018-07-31 PROCEDURE — 93297 REM INTERROG DEV EVAL ICPMS: CPT | Performed by: INTERNAL MEDICINE

## 2018-07-31 PROCEDURE — 93295 DEV INTERROG REMOTE 1/2/MLT: CPT | Performed by: INTERNAL MEDICINE

## 2018-07-31 PROCEDURE — 93296 REM INTERROG EVL PM/IDS: CPT | Performed by: INTERNAL MEDICINE

## 2018-08-06 RX ORDER — ASPIRIN 81 MG/1
TABLET, COATED ORAL
Qty: 90 TABLET | Refills: 3 | Status: SHIPPED | OUTPATIENT
Start: 2018-08-06

## 2018-08-06 NOTE — TELEPHONE ENCOUNTER
ROLF Pt  LOV 5/29/18  Assessment/Plan:    1. Hypertensive heart disease with heart failure ([facility] [address]    2. Essential hypertension    3. Pure hypercholesterolemia    4. HOCM (hypertrophic obstructive cardiomyopathy) ([facility] [address]    5. Nonrheumatic aortic valve stenosis    6. Cardiac defibrillator in place     5. Hypertension:  uncontrolled        Plan:   1. Continue current medications    2. Echocardiogram in 9 months   3.  Follow up with me in 9 months

## 2018-09-06 ENCOUNTER — TELEPHONE (OUTPATIENT)
Dept: CARDIOLOGY CLINIC | Age: 83
End: 2018-09-06

## 2018-09-06 RX ORDER — HYDROCHLOROTHIAZIDE 25 MG/1
25 TABLET ORAL
Qty: 30 TABLET | Refills: 5 | Status: SHIPPED | OUTPATIENT
Start: 2018-09-06 | End: 2019-01-01 | Stop reason: SDUPTHER

## 2018-10-05 ENCOUNTER — TELEPHONE (OUTPATIENT)
Dept: CARDIOLOGY CLINIC | Age: 83
End: 2018-10-05

## 2018-10-05 RX ORDER — LISINOPRIL 2.5 MG/1
TABLET ORAL
Qty: 90 TABLET | Refills: 1 | Status: SHIPPED | OUTPATIENT
Start: 2018-10-05 | End: 2019-01-01 | Stop reason: SDUPTHER

## 2018-10-24 LAB
ALBUMIN SERPL-MCNC: 4.1 G/DL
ALP BLD-CCNC: 141 U/L
ALT SERPL-CCNC: 39 U/L
ANION GAP SERPL CALCULATED.3IONS-SCNC: NORMAL MMOL/L
AST SERPL-CCNC: 37 U/L
BILIRUB SERPL-MCNC: 0.5 MG/DL (ref 0.1–1.4)
BUN BLDV-MCNC: 24 MG/DL
CALCIUM SERPL-MCNC: 9.2 MG/DL
CHLORIDE BLD-SCNC: 102 MMOL/L
CHOLESTEROL, TOTAL: 127 MG/DL
CHOLESTEROL/HDL RATIO: ABNORMAL
CO2: 27 MMOL/L
CREAT SERPL-MCNC: 1.42 MG/DL
GFR CALCULATED: NORMAL
GLUCOSE BLD-MCNC: 93 MG/DL
HDLC SERPL-MCNC: 34 MG/DL (ref 35–70)
LDL CHOLESTEROL CALCULATED: 79 MG/DL (ref 0–160)
POTASSIUM SERPL-SCNC: 4.5 MMOL/L
SODIUM BLD-SCNC: 142 MMOL/L
TOTAL PROTEIN: 7
TRIGL SERPL-MCNC: 68 MG/DL
VLDLC SERPL CALC-MCNC: ABNORMAL MG/DL

## 2018-10-30 ENCOUNTER — NURSE ONLY (OUTPATIENT)
Dept: CARDIOLOGY CLINIC | Age: 83
End: 2018-10-30
Payer: MEDICARE

## 2018-10-30 DIAGNOSIS — I42.1 HOCM (HYPERTROPHIC OBSTRUCTIVE CARDIOMYOPATHY) (HCC): ICD-10-CM

## 2018-10-30 DIAGNOSIS — Z95.810 CARDIAC DEFIBRILLATOR IN PLACE: ICD-10-CM

## 2018-10-30 DIAGNOSIS — I50.22 CHF (CONGESTIVE HEART FAILURE), NYHA CLASS I, CHRONIC, SYSTOLIC (HCC): ICD-10-CM

## 2018-10-30 PROCEDURE — 93295 DEV INTERROG REMOTE 1/2/MLT: CPT | Performed by: INTERNAL MEDICINE

## 2018-10-30 PROCEDURE — 93296 REM INTERROG EVL PM/IDS: CPT | Performed by: INTERNAL MEDICINE

## 2018-10-30 NOTE — LETTER
[address] [facility] [phone]  [address] [facility]    Pacemaker/Defibrillator Clinic          10/31/18        Christi Purcell  [phone]        Dear Christi Purcell    This letter is to inform you that we received the transmission from your monitor at home that checks your pacemaker and/or defibrillator, or implanted heart monitor. The next date you should transmit will be 2/5/19. Please do not send additional routine transmissions unless specifically requested. Please be aware that the remote device transmission sites are periodically monitored only during regular business hours during which simultaneous in-office device clinics are being run. If your transmission requires attention, we will contact you as soon as possible. Thank you.             [address]

## 2018-10-31 NOTE — PROGRESS NOTES
Carelink transmission shows normal functioning device. OptiVol shows an elevation in fluids since mid-Sept.   NSVT recorded on 9/23/18, lasting approx 1 sec. - on Amio 200mg QD and Lopressor 50mg QD    Dr. Jya Bowling to review interrogation. See interrogation/Paceart report for further details.

## 2018-11-08 ENCOUNTER — TELEPHONE (OUTPATIENT)
Dept: CARDIOLOGY CLINIC | Age: 83
End: 2018-11-08

## 2019-01-01 ENCOUNTER — TELEPHONE (OUTPATIENT)
Dept: CARDIOLOGY CLINIC | Age: 84
End: 2019-01-01

## 2019-01-01 ENCOUNTER — OFFICE VISIT (OUTPATIENT)
Dept: CARDIOLOGY CLINIC | Age: 84
End: 2019-01-01
Payer: MEDICARE

## 2019-01-01 ENCOUNTER — APPOINTMENT (OUTPATIENT)
Dept: GENERAL RADIOLOGY | Age: 84
DRG: 482 | End: 2019-01-01
Payer: MEDICARE

## 2019-01-01 ENCOUNTER — HOSPITAL ENCOUNTER (INPATIENT)
Age: 84
LOS: 4 days | Discharge: NURSING, REHAB OR LONG-TERM CARE | DRG: 482 | End: 2019-06-05
Attending: EMERGENCY MEDICINE | Admitting: INTERNAL MEDICINE
Payer: MEDICARE

## 2019-01-01 ENCOUNTER — TELEPHONE (OUTPATIENT)
Dept: GASTROENTEROLOGY | Age: 84
End: 2019-01-01

## 2019-01-01 ENCOUNTER — HOSPITAL ENCOUNTER (OUTPATIENT)
Age: 84
Discharge: HOME | End: 2019-09-13
Payer: MEDICARE

## 2019-01-01 ENCOUNTER — NURSE ONLY (OUTPATIENT)
Dept: CARDIOLOGY CLINIC | Age: 84
End: 2019-01-01
Payer: MEDICARE

## 2019-01-01 ENCOUNTER — OFFICE VISIT (OUTPATIENT)
Dept: GASTROENTEROLOGY | Age: 84
End: 2019-01-01
Payer: MEDICARE

## 2019-01-01 ENCOUNTER — HOSPITAL ENCOUNTER (OUTPATIENT)
Dept: VASCULAR LAB | Age: 84
Discharge: HOME | End: 2019-02-26
Payer: MEDICARE

## 2019-01-01 ENCOUNTER — HOSPITAL ENCOUNTER (OUTPATIENT)
Age: 84
Discharge: HOME | End: 2019-05-06
Payer: MEDICARE

## 2019-01-01 ENCOUNTER — HOSPITAL ENCOUNTER (OUTPATIENT)
Dept: ULTRASOUND IMAGING | Age: 84
Discharge: HOME | End: 2019-02-19
Payer: MEDICARE

## 2019-01-01 ENCOUNTER — ANESTHESIA (OUTPATIENT)
Dept: OPERATING ROOM | Age: 84
DRG: 482 | End: 2019-01-01
Payer: MEDICARE

## 2019-01-01 ENCOUNTER — HOSPITAL ENCOUNTER (OUTPATIENT)
Age: 84
Discharge: HOME | End: 2019-11-14
Payer: MEDICARE

## 2019-01-01 ENCOUNTER — HOSPITAL ENCOUNTER (EMERGENCY)
Age: 84
End: 2019-12-04
Attending: EMERGENCY MEDICINE
Payer: MEDICARE

## 2019-01-01 ENCOUNTER — HOSPITAL ENCOUNTER (OUTPATIENT)
Age: 84
Setting detail: SPECIMEN
Discharge: HOME | End: 2019-07-20
Payer: MEDICARE

## 2019-01-01 ENCOUNTER — HOSPITAL ENCOUNTER (OUTPATIENT)
Age: 84
Discharge: HOME | End: 2019-02-13
Payer: MEDICARE

## 2019-01-01 ENCOUNTER — PROCEDURE VISIT (OUTPATIENT)
Dept: CARDIOLOGY CLINIC | Age: 84
End: 2019-01-01
Payer: MEDICARE

## 2019-01-01 ENCOUNTER — HOSPITAL ENCOUNTER (OUTPATIENT)
Age: 84
Discharge: HOME | End: 2019-09-06
Payer: MEDICARE

## 2019-01-01 ENCOUNTER — HOSPITAL ENCOUNTER (OUTPATIENT)
Age: 84
Discharge: HOME | End: 2019-02-07
Payer: MEDICARE

## 2019-01-01 ENCOUNTER — HOSPITAL ENCOUNTER (OUTPATIENT)
Dept: CARDIOLOGY | Age: 84
Discharge: HOME | End: 2019-02-19
Payer: MEDICARE

## 2019-01-01 ENCOUNTER — PROCEDURE VISIT (OUTPATIENT)
Dept: CARDIOLOGY CLINIC | Age: 84
End: 2019-01-01

## 2019-01-01 ENCOUNTER — ANESTHESIA EVENT (OUTPATIENT)
Dept: OPERATING ROOM | Age: 84
DRG: 482 | End: 2019-01-01
Payer: MEDICARE

## 2019-01-01 ENCOUNTER — OFFICE VISIT (OUTPATIENT)
Dept: ORTHOPEDIC SURGERY | Age: 84
End: 2019-01-01

## 2019-01-01 ENCOUNTER — HOSPITAL ENCOUNTER (OUTPATIENT)
Age: 84
Discharge: HOME | End: 2019-03-06
Payer: MEDICARE

## 2019-01-01 VITALS
WEIGHT: 146 LBS | SYSTOLIC BLOOD PRESSURE: 140 MMHG | DIASTOLIC BLOOD PRESSURE: 70 MMHG | BODY MASS INDEX: 26.87 KG/M2 | HEIGHT: 62 IN

## 2019-01-01 VITALS
BODY MASS INDEX: 26.76 KG/M2 | HEART RATE: 83 BPM | DIASTOLIC BLOOD PRESSURE: 78 MMHG | SYSTOLIC BLOOD PRESSURE: 128 MMHG | OXYGEN SATURATION: 94 % | WEIGHT: 145.4 LBS | HEIGHT: 62 IN

## 2019-01-01 VITALS
DIASTOLIC BLOOD PRESSURE: 66 MMHG | HEIGHT: 62 IN | HEART RATE: 82 BPM | SYSTOLIC BLOOD PRESSURE: 102 MMHG | RESPIRATION RATE: 16 BRPM | WEIGHT: 145 LBS | OXYGEN SATURATION: 96 % | TEMPERATURE: 98.7 F | BODY MASS INDEX: 26.68 KG/M2

## 2019-01-01 VITALS
HEART RATE: 84 BPM | BODY MASS INDEX: 26.59 KG/M2 | DIASTOLIC BLOOD PRESSURE: 68 MMHG | HEIGHT: 62 IN | WEIGHT: 144.5 LBS | OXYGEN SATURATION: 94 % | SYSTOLIC BLOOD PRESSURE: 110 MMHG

## 2019-01-01 VITALS
WEIGHT: 146 LBS | HEIGHT: 62 IN | DIASTOLIC BLOOD PRESSURE: 64 MMHG | SYSTOLIC BLOOD PRESSURE: 108 MMHG | HEART RATE: 98 BPM | BODY MASS INDEX: 26.87 KG/M2

## 2019-01-01 VITALS
WEIGHT: 142.6 LBS | HEIGHT: 62 IN | SYSTOLIC BLOOD PRESSURE: 130 MMHG | DIASTOLIC BLOOD PRESSURE: 64 MMHG | HEART RATE: 86 BPM | BODY MASS INDEX: 26.24 KG/M2 | OXYGEN SATURATION: 93 %

## 2019-01-01 VITALS
DIASTOLIC BLOOD PRESSURE: 62 MMHG | OXYGEN SATURATION: 93 % | HEIGHT: 62 IN | WEIGHT: 142.5 LBS | BODY MASS INDEX: 26.22 KG/M2 | HEART RATE: 82 BPM | SYSTOLIC BLOOD PRESSURE: 128 MMHG

## 2019-01-01 VITALS
DIASTOLIC BLOOD PRESSURE: 101 MMHG | RESPIRATION RATE: 15 BRPM | SYSTOLIC BLOOD PRESSURE: 120 MMHG | OXYGEN SATURATION: 100 %

## 2019-01-01 VITALS
DIASTOLIC BLOOD PRESSURE: 68 MMHG | WEIGHT: 143 LBS | SYSTOLIC BLOOD PRESSURE: 98 MMHG | BODY MASS INDEX: 26.31 KG/M2 | HEIGHT: 62 IN

## 2019-01-01 VITALS — BODY MASS INDEX: 26.57 KG/M2 | WEIGHT: 144.4 LBS | HEIGHT: 62 IN

## 2019-01-01 DIAGNOSIS — Z95.810 CARDIAC DEFIBRILLATOR IN PLACE: ICD-10-CM

## 2019-01-01 DIAGNOSIS — R60.0 BILATERAL LEG EDEMA: Primary | ICD-10-CM

## 2019-01-01 DIAGNOSIS — R79.89 ELEVATED LFTS: Primary | ICD-10-CM

## 2019-01-01 DIAGNOSIS — Z79.899 MEDICATION MANAGEMENT: ICD-10-CM

## 2019-01-01 DIAGNOSIS — Z85.118 HISTORY OF LUNG CANCER: ICD-10-CM

## 2019-01-01 DIAGNOSIS — I11.0 HYPERTENSIVE HEART DISEASE WITH HEART FAILURE (HCC): ICD-10-CM

## 2019-01-01 DIAGNOSIS — Z79.899 MEDICATION MANAGEMENT: Primary | ICD-10-CM

## 2019-01-01 DIAGNOSIS — I50.22 CHF (CONGESTIVE HEART FAILURE), NYHA CLASS I, CHRONIC, SYSTOLIC (HCC): ICD-10-CM

## 2019-01-01 DIAGNOSIS — Z95.810 CARDIAC DEFIBRILLATOR IN PLACE: Primary | ICD-10-CM

## 2019-01-01 DIAGNOSIS — R06.02 SOB (SHORTNESS OF BREATH): ICD-10-CM

## 2019-01-01 DIAGNOSIS — I46.9 CARDIAC ARREST (HCC): Primary | ICD-10-CM

## 2019-01-01 DIAGNOSIS — I10 ESSENTIAL (PRIMARY) HYPERTENSION: ICD-10-CM

## 2019-01-01 DIAGNOSIS — R74.8 ABNORMAL LIVER ENZYMES: Primary | ICD-10-CM

## 2019-01-01 DIAGNOSIS — R79.89 ELEVATED LFTS: ICD-10-CM

## 2019-01-01 DIAGNOSIS — I42.1 HOCM (HYPERTROPHIC OBSTRUCTIVE CARDIOMYOPATHY) (HCC): ICD-10-CM

## 2019-01-01 DIAGNOSIS — I11.0 HYPERTENSIVE HEART DISEASE WITH HEART FAILURE (HCC): Primary | ICD-10-CM

## 2019-01-01 DIAGNOSIS — R74.8 ABNORMAL LIVER ENZYMES: ICD-10-CM

## 2019-01-01 DIAGNOSIS — R09.89 BRUIT: Primary | ICD-10-CM

## 2019-01-01 DIAGNOSIS — J96.00 ACUTE RESPIRATORY FAILURE, UNSPECIFIED WHETHER WITH HYPOXIA OR HYPERCAPNIA (HCC): ICD-10-CM

## 2019-01-01 DIAGNOSIS — M79.644 FINGER PAIN, RIGHT: ICD-10-CM

## 2019-01-01 DIAGNOSIS — M25.551 HIP PAIN, RIGHT: Primary | ICD-10-CM

## 2019-01-01 DIAGNOSIS — I42.1 HOCM (HYPERTROPHIC OBSTRUCTIVE CARDIOMYOPATHY) (HCC): Primary | ICD-10-CM

## 2019-01-01 DIAGNOSIS — I35.0 NONRHEUMATIC AORTIC VALVE STENOSIS: ICD-10-CM

## 2019-01-01 DIAGNOSIS — E78.00 PURE HYPERCHOLESTEROLEMIA: ICD-10-CM

## 2019-01-01 DIAGNOSIS — S72.001A CLOSED FRACTURE OF NECK OF RIGHT FEMUR, INITIAL ENCOUNTER (HCC): Primary | ICD-10-CM

## 2019-01-01 DIAGNOSIS — I47.20 VENTRICULAR TACHYCARDIA: ICD-10-CM

## 2019-01-01 DIAGNOSIS — I47.20 VENTRICULAR TACHYCARDIA: Primary | ICD-10-CM

## 2019-01-01 DIAGNOSIS — R09.89 BRUIT: ICD-10-CM

## 2019-01-01 LAB
A/G RATIO: 1.3 (ref 1.1–2.2)
A/G RATIO: 1.4 (ref 1.1–2.2)
A/G RATIO: 1.4 (ref 1.1–2.2)
A/G RATIO: 1.5 (ref 1.1–2.2)
ABO/RH: NORMAL
ALBUMIN SERPL-MCNC: 3.9 G/DL (ref 3.4–5)
ALBUMIN SERPL-MCNC: 4 G/DL (ref 3.4–5)
ALBUMIN SERPL-MCNC: 4.2 G/DL (ref 3.4–5)
ALP BLD-CCNC: 110 U/L (ref 40–129)
ALP BLD-CCNC: 116 U/L (ref 40–129)
ALP BLD-CCNC: 122 U/L (ref 40–129)
ALP BLD-CCNC: 125 U/L (ref 40–129)
ALP BLD-CCNC: 128 U/L (ref 40–129)
ALP BLD-CCNC: 144 U/L (ref 40–129)
ALP BLD-CCNC: 175 U/L (ref 40–129)
ALP BLD-CCNC: 218 U/L (ref 40–129)
ALPHA-1 ANTITRYPSIN: 172 MG/DL (ref 90–200)
ALT SERPL-CCNC: 115 U/L (ref 10–40)
ALT SERPL-CCNC: 178 U/L (ref 10–40)
ALT SERPL-CCNC: 38 U/L (ref 10–40)
ALT SERPL-CCNC: 41 U/L (ref 10–40)
ALT SERPL-CCNC: 54 U/L (ref 10–40)
ALT SERPL-CCNC: 63 U/L (ref 10–40)
ALT SERPL-CCNC: 71 U/L (ref 10–40)
ALT SERPL-CCNC: 91 U/L (ref 10–40)
ANION GAP SERPL CALCULATED.3IONS-SCNC: 11 MMOL/L (ref 3–16)
ANION GAP SERPL CALCULATED.3IONS-SCNC: 13 MMOL/L (ref 3–16)
ANION GAP SERPL CALCULATED.3IONS-SCNC: 13 MMOL/L (ref 3–16)
ANION GAP SERPL CALCULATED.3IONS-SCNC: 16 MMOL/L (ref 3–16)
ANION GAP SERPL CALCULATED.3IONS-SCNC: 16 MMOL/L (ref 3–16)
ANION GAP SERPL CALCULATED.3IONS-SCNC: 18 MMOL/L (ref 3–16)
ANION GAP SERPL CALCULATED.3IONS-SCNC: 7 MMOL/L (ref 3–16)
ANION GAP SERPL CALCULATED.3IONS-SCNC: 9 MMOL/L (ref 3–16)
ANTI-NUCLEAR ANTIBODY (ANA): NEGATIVE
ANTIBODY SCREEN: NORMAL
AST SERPL-CCNC: 108 U/L (ref 15–37)
AST SERPL-CCNC: 132 U/L (ref 15–37)
AST SERPL-CCNC: 37 U/L (ref 15–37)
AST SERPL-CCNC: 37 U/L (ref 15–37)
AST SERPL-CCNC: 58 U/L (ref 15–37)
AST SERPL-CCNC: 61 U/L (ref 15–37)
AST SERPL-CCNC: 62 U/L (ref 15–37)
AST SERPL-CCNC: 64 U/L (ref 15–37)
BACTERIA: ABNORMAL /HPF
BASE EXCESS VENOUS: -10 (ref -3–3)
BASOPHILS ABSOLUTE: 0.1 K/UL (ref 0–0.2)
BASOPHILS RELATIVE PERCENT: 0.6 %
BILIRUB SERPL-MCNC: 0.4 MG/DL (ref 0–1)
BILIRUB SERPL-MCNC: 0.5 MG/DL (ref 0–1)
BILIRUBIN DIRECT: <0.2 MG/DL (ref 0–0.3)
BILIRUBIN URINE: NEGATIVE
BILIRUBIN, INDIRECT: ABNORMAL MG/DL (ref 0–1)
BLOOD, URINE: ABNORMAL
BUN BLDV-MCNC: 18 MG/DL (ref 7–20)
BUN BLDV-MCNC: 19 MG/DL (ref 7–20)
BUN BLDV-MCNC: 20 MG/DL (ref 7–20)
BUN BLDV-MCNC: 23 MG/DL (ref 7–20)
BUN BLDV-MCNC: 25 MG/DL (ref 7–20)
BUN BLDV-MCNC: 25 MG/DL (ref 7–20)
BUN BLDV-MCNC: 34 MG/DL (ref 7–20)
BUN BLDV-MCNC: 35 MG/DL (ref 7–20)
CALCIUM IONIZED: 1.08 MMOL/L (ref 1.12–1.32)
CALCIUM SERPL-MCNC: 8.5 MG/DL (ref 8.3–10.6)
CALCIUM SERPL-MCNC: 8.8 MG/DL (ref 8.3–10.6)
CALCIUM SERPL-MCNC: 8.9 MG/DL (ref 8.3–10.6)
CALCIUM SERPL-MCNC: 9.2 MG/DL (ref 8.3–10.6)
CALCIUM SERPL-MCNC: 9.2 MG/DL (ref 8.3–10.6)
CALCIUM SERPL-MCNC: 9.3 MG/DL (ref 8.3–10.6)
CALCIUM SERPL-MCNC: 9.6 MG/DL (ref 8.3–10.6)
CALCIUM SERPL-MCNC: 9.6 MG/DL (ref 8.3–10.6)
CHLORIDE BLD-SCNC: 100 MMOL/L (ref 99–110)
CHLORIDE BLD-SCNC: 100 MMOL/L (ref 99–110)
CHLORIDE BLD-SCNC: 101 MMOL/L (ref 99–110)
CHLORIDE BLD-SCNC: 102 MMOL/L (ref 99–110)
CHLORIDE BLD-SCNC: 102 MMOL/L (ref 99–110)
CHLORIDE BLD-SCNC: 103 MMOL/L (ref 99–110)
CHLORIDE BLD-SCNC: 97 MMOL/L (ref 99–110)
CHLORIDE BLD-SCNC: 99 MMOL/L (ref 99–110)
CLARITY: CLEAR
CO2: 23 MMOL/L (ref 21–32)
CO2: 24 MMOL/L (ref 21–32)
CO2: 26 MMOL/L (ref 21–32)
CO2: 28 MMOL/L (ref 21–32)
CO2: 28 MMOL/L (ref 21–32)
COLOR: YELLOW
CREAT SERPL-MCNC: 1.1 MG/DL (ref 0.6–1.2)
CREAT SERPL-MCNC: 1.2 MG/DL (ref 0.6–1.2)
CREAT SERPL-MCNC: 1.3 MG/DL (ref 0.6–1.2)
CREAT SERPL-MCNC: 1.3 MG/DL (ref 0.6–1.2)
CREAT SERPL-MCNC: 1.5 MG/DL (ref 0.6–1.2)
EKG ATRIAL RATE: 82 BPM
EKG DIAGNOSIS: NORMAL
EKG P AXIS: 85 DEGREES
EKG P-R INTERVAL: 234 MS
EKG Q-T INTERVAL: 434 MS
EKG QRS DURATION: 156 MS
EKG QTC CALCULATION (BAZETT): 507 MS
EKG R AXIS: 97 DEGREES
EKG T AXIS: -46 DEGREES
EKG VENTRICULAR RATE: 82 BPM
EOSINOPHILS ABSOLUTE: 0.1 K/UL (ref 0–0.6)
EOSINOPHILS RELATIVE PERCENT: 0.8 %
EPITHELIAL CELLS, UA: ABNORMAL /HPF
F-ACTIN AB IGG: 11 UNITS (ref 0–19)
FERRITIN: 84.7 NG/ML (ref 15–150)
GFR AFRICAN AMERICAN: 10
GFR AFRICAN AMERICAN: 40
GFR AFRICAN AMERICAN: 47
GFR AFRICAN AMERICAN: 47
GFR AFRICAN AMERICAN: 51
GFR AFRICAN AMERICAN: 57
GFR NON-AFRICAN AMERICAN: 33
GFR NON-AFRICAN AMERICAN: 39
GFR NON-AFRICAN AMERICAN: 39
GFR NON-AFRICAN AMERICAN: 43
GFR NON-AFRICAN AMERICAN: 47
GFR NON-AFRICAN AMERICAN: 8
GLOBULIN: 2.6 G/DL
GLOBULIN: 2.8 G/DL
GLOBULIN: 2.9 G/DL
GLOBULIN: 3 G/DL
GLUCOSE BLD-MCNC: 103 MG/DL (ref 70–99)
GLUCOSE BLD-MCNC: 120 MG/DL (ref 70–99)
GLUCOSE BLD-MCNC: 137 MG/DL (ref 70–99)
GLUCOSE BLD-MCNC: 56 MG/DL (ref 70–99)
GLUCOSE BLD-MCNC: 79 MG/DL (ref 70–99)
GLUCOSE BLD-MCNC: 85 MG/DL (ref 70–99)
GLUCOSE BLD-MCNC: 88 MG/DL (ref 70–99)
GLUCOSE BLD-MCNC: 90 MG/DL (ref 70–99)
GLUCOSE BLD-MCNC: 95 MG/DL (ref 70–99)
GLUCOSE BLD-MCNC: 95 MG/DL (ref 70–99)
GLUCOSE URINE: NEGATIVE MG/DL
HCO3 VENOUS: 20.4 MMOL/L (ref 23–29)
HCT VFR BLD CALC: 26 % (ref 36–48)
HCT VFR BLD CALC: 27.7 % (ref 36–48)
HCT VFR BLD CALC: 36.8 % (ref 36–48)
HCT VFR BLD CALC: 41.7 % (ref 36–48)
HCT VFR BLD CALC: 42.4 % (ref 36–48)
HEMOGLOBIN: 11.7 G/DL (ref 12–16)
HEMOGLOBIN: 13.7 G/DL (ref 12–16)
HEMOGLOBIN: 14.1 G/DL (ref 12–16)
HEMOGLOBIN: 8.8 G/DL (ref 12–16)
HEMOGLOBIN: 9.3 G/DL (ref 12–16)
HEPATITIS B SURFACE ANTIGEN INTERPRETATION: NORMAL
HEPATITIS C ANTIBODY INTERPRETATION: NORMAL
INR BLD: 1.04 (ref 0.86–1.14)
IRON SATURATION: 33 % (ref 15–50)
IRON: 105 UG/DL (ref 37–145)
KETONES, URINE: NEGATIVE MG/DL
LACTATE: 9.26 MMOL/L (ref 0.4–2)
LEUKOCYTE ESTERASE, URINE: ABNORMAL
LIVER-KIDNEY MICROSOME-1 AB IGG: 1.5 U (ref 0–24.9)
LV EF: 65 %
LVEF MODALITY: NORMAL
LYMPHOCYTES ABSOLUTE: 1.5 K/UL (ref 1–5.1)
LYMPHOCYTES RELATIVE PERCENT: 14.5 %
MCH RBC QN AUTO: 28.5 PG (ref 26–34)
MCH RBC QN AUTO: 31.8 PG (ref 26–34)
MCH RBC QN AUTO: 32 PG (ref 26–34)
MCH RBC QN AUTO: 32.6 PG (ref 26–34)
MCH RBC QN AUTO: 32.7 PG (ref 26–34)
MCHC RBC AUTO-ENTMCNC: 31.7 G/DL (ref 31–36)
MCHC RBC AUTO-ENTMCNC: 32.9 G/DL (ref 31–36)
MCHC RBC AUTO-ENTMCNC: 33.2 G/DL (ref 31–36)
MCHC RBC AUTO-ENTMCNC: 33.5 G/DL (ref 31–36)
MCHC RBC AUTO-ENTMCNC: 33.8 G/DL (ref 31–36)
MCV RBC AUTO: 89.8 FL (ref 80–100)
MCV RBC AUTO: 96.4 FL (ref 80–100)
MCV RBC AUTO: 96.4 FL (ref 80–100)
MCV RBC AUTO: 96.8 FL (ref 80–100)
MCV RBC AUTO: 97.4 FL (ref 80–100)
MICROSCOPIC EXAMINATION: YES
MITOCHONDRIAL M2 AB, IGG: 3.3 UNITS (ref 0–20)
MONOCYTES ABSOLUTE: 0.7 K/UL (ref 0–1.3)
MONOCYTES RELATIVE PERCENT: 7.2 %
NEUTROPHILS ABSOLUTE: 7.9 K/UL (ref 1.7–7.7)
NEUTROPHILS RELATIVE PERCENT: 76.9 %
NITRITE, URINE: NEGATIVE
O2 SAT, VEN: 24 %
PCO2, VEN: 76.9 MM HG (ref 40–50)
PDW BLD-RTO: 14.1 % (ref 12.4–15.4)
PDW BLD-RTO: 14.3 % (ref 12.4–15.4)
PDW BLD-RTO: 14.4 % (ref 12.4–15.4)
PDW BLD-RTO: 14.4 % (ref 12.4–15.4)
PDW BLD-RTO: 18.9 % (ref 12.4–15.4)
PERFORMED ON: ABNORMAL
PERFORMED ON: NORMAL
PH UA: 6 (ref 5–8)
PH VENOUS: 7.03 (ref 7.35–7.45)
PLATELET # BLD: 101 K/UL (ref 135–450)
PLATELET # BLD: 131 K/UL (ref 135–450)
PLATELET # BLD: 137 K/UL (ref 135–450)
PLATELET # BLD: 150 K/UL (ref 135–450)
PLATELET # BLD: 216 K/UL (ref 135–450)
PMV BLD AUTO: 10.4 FL (ref 5–10.5)
PMV BLD AUTO: 9.2 FL (ref 5–10.5)
PMV BLD AUTO: 9.3 FL (ref 5–10.5)
PMV BLD AUTO: 9.4 FL (ref 5–10.5)
PMV BLD AUTO: 9.9 FL (ref 5–10.5)
PO2, VEN: 25 MM HG
POC ANION GAP: 11 (ref 10–20)
POC BUN: 61 MG/DL (ref 7–18)
POC CHLORIDE: 100 MMOL/L (ref 99–110)
POC CREATININE: 5 MG/DL (ref 0.6–1.2)
POC POTASSIUM: 6.2 MMOL/L (ref 3.5–5.1)
POC SAMPLE TYPE: ABNORMAL
POC SODIUM: 134 MMOL/L (ref 136–145)
POC TROPONIN I: 0.31 NG/ML (ref 0–0.1)
POTASSIUM SERPL-SCNC: 3 MMOL/L (ref 3.5–5.1)
POTASSIUM SERPL-SCNC: 3.8 MMOL/L (ref 3.5–5.1)
POTASSIUM SERPL-SCNC: 4 MMOL/L (ref 3.5–5.1)
POTASSIUM SERPL-SCNC: 4.7 MMOL/L (ref 3.5–5.1)
POTASSIUM SERPL-SCNC: 4.9 MMOL/L (ref 3.5–5.1)
POTASSIUM SERPL-SCNC: 5 MMOL/L (ref 3.5–5.1)
POTASSIUM SERPL-SCNC: 5 MMOL/L (ref 3.5–5.1)
POTASSIUM SERPL-SCNC: 5.7 MMOL/L (ref 3.5–5.1)
PRO-BNP: 1255 PG/ML (ref 0–449)
PRO-BNP: 666 PG/ML (ref 0–449)
PRO-BNP: 694 PG/ML (ref 0–449)
PROTEIN UA: NEGATIVE MG/DL
PROTHROMBIN TIME: 11.8 SEC (ref 9.8–13)
RBC # BLD: 2.69 M/UL (ref 4–5.2)
RBC # BLD: 2.85 M/UL (ref 4–5.2)
RBC # BLD: 4.1 M/UL (ref 4–5.2)
RBC # BLD: 4.32 M/UL (ref 4–5.2)
RBC # BLD: 4.41 M/UL (ref 4–5.2)
RBC UA: ABNORMAL /HPF (ref 0–2)
SODIUM BLD-SCNC: 136 MMOL/L (ref 136–145)
SODIUM BLD-SCNC: 137 MMOL/L (ref 136–145)
SODIUM BLD-SCNC: 139 MMOL/L (ref 136–145)
SODIUM BLD-SCNC: 140 MMOL/L (ref 136–145)
SODIUM BLD-SCNC: 141 MMOL/L (ref 136–145)
SODIUM BLD-SCNC: 142 MMOL/L (ref 136–145)
SPECIFIC GRAVITY UA: 1.02 (ref 1–1.03)
TCO2 CALC VENOUS: 23 MMOL/L
TOTAL IRON BINDING CAPACITY: 314 UG/DL (ref 260–445)
TOTAL PROTEIN: 6.6 G/DL (ref 6.4–8.2)
TOTAL PROTEIN: 6.7 G/DL (ref 6.4–8.2)
TOTAL PROTEIN: 6.7 G/DL (ref 6.4–8.2)
TOTAL PROTEIN: 6.9 G/DL (ref 6.4–8.2)
TOTAL PROTEIN: 6.9 G/DL (ref 6.4–8.2)
TOTAL PROTEIN: 7.3 G/DL (ref 6.4–8.2)
TSH REFLEX FT4: 3.53 UIU/ML (ref 0.27–4.2)
TSH REFLEX FT4: 3.84 UIU/ML (ref 0.27–4.2)
URINE TYPE: ABNORMAL
UROBILINOGEN, URINE: 0.2 E.U./DL
VITAMIN D 25-HYDROXY: 44.1 NG/ML
WBC # BLD: 10.2 K/UL (ref 4–11)
WBC # BLD: 10.3 K/UL (ref 4–11)
WBC # BLD: 10.6 K/UL (ref 4–11)
WBC # BLD: 11.3 K/UL (ref 4–11)
WBC # BLD: 12.5 K/UL (ref 4–11)
WBC UA: ABNORMAL /HPF (ref 0–5)

## 2019-01-01 PROCEDURE — 93297 REM INTERROG DEV EVAL ICPMS: CPT | Performed by: INTERNAL MEDICINE

## 2019-01-01 PROCEDURE — 93296 REM INTERROG EVL PM/IDS: CPT | Performed by: INTERNAL MEDICINE

## 2019-01-01 PROCEDURE — 6370000000 HC RX 637 (ALT 250 FOR IP): Performed by: ORTHOPAEDIC SURGERY

## 2019-01-01 PROCEDURE — 36415 COLL VENOUS BLD VENIPUNCTURE: CPT

## 2019-01-01 PROCEDURE — 93290 INTERROG DEV EVAL ICPMS IP: CPT | Performed by: NURSE PRACTITIONER

## 2019-01-01 PROCEDURE — 3600000004 HC SURGERY LEVEL 4 BASE: Performed by: ORTHOPAEDIC SURGERY

## 2019-01-01 PROCEDURE — 97535 SELF CARE MNGMENT TRAINING: CPT

## 2019-01-01 PROCEDURE — 2580000003 HC RX 258: Performed by: ORTHOPAEDIC SURGERY

## 2019-01-01 PROCEDURE — 1123F ACP DISCUSS/DSCN MKR DOCD: CPT | Performed by: INTERNAL MEDICINE

## 2019-01-01 PROCEDURE — G8427 DOCREV CUR MEDS BY ELIG CLIN: HCPCS | Performed by: INTERNAL MEDICINE

## 2019-01-01 PROCEDURE — 2700000000 HC OXYGEN THERAPY PER DAY

## 2019-01-01 PROCEDURE — 80048 BASIC METABOLIC PNL TOTAL CA: CPT

## 2019-01-01 PROCEDURE — 99214 OFFICE O/P EST MOD 30 MIN: CPT | Performed by: NURSE PRACTITIONER

## 2019-01-01 PROCEDURE — 86803 HEPATITIS C AB TEST: CPT

## 2019-01-01 PROCEDURE — G8417 CALC BMI ABV UP PARAM F/U: HCPCS | Performed by: NURSE PRACTITIONER

## 2019-01-01 PROCEDURE — 7100000001 HC PACU RECOVERY - ADDTL 15 MIN: Performed by: ORTHOPAEDIC SURGERY

## 2019-01-01 PROCEDURE — 73552 X-RAY EXAM OF FEMUR 2/>: CPT

## 2019-01-01 PROCEDURE — 97166 OT EVAL MOD COMPLEX 45 MIN: CPT

## 2019-01-01 PROCEDURE — 6370000000 HC RX 637 (ALT 250 FOR IP): Performed by: NURSE PRACTITIONER

## 2019-01-01 PROCEDURE — 6360000002 HC RX W HCPCS: Performed by: ORTHOPAEDIC SURGERY

## 2019-01-01 PROCEDURE — 2580000003 HC RX 258: Performed by: ANESTHESIOLOGY

## 2019-01-01 PROCEDURE — 83880 ASSAY OF NATRIURETIC PEPTIDE: CPT

## 2019-01-01 PROCEDURE — 4040F PNEUMOC VAC/ADMIN/RCVD: CPT | Performed by: INTERNAL MEDICINE

## 2019-01-01 PROCEDURE — 80053 COMPREHEN METABOLIC PANEL: CPT

## 2019-01-01 PROCEDURE — 1200000000 HC SEMI PRIVATE

## 2019-01-01 PROCEDURE — C9290 INJ, BUPIVACAINE LIPOSOME: HCPCS | Performed by: ORTHOPAEDIC SURGERY

## 2019-01-01 PROCEDURE — 71045 X-RAY EXAM CHEST 1 VIEW: CPT

## 2019-01-01 PROCEDURE — 83516 IMMUNOASSAY NONANTIBODY: CPT

## 2019-01-01 PROCEDURE — 86850 RBC ANTIBODY SCREEN: CPT

## 2019-01-01 PROCEDURE — 1111F DSCHRG MED/CURRENT MED MERGE: CPT | Performed by: INTERNAL MEDICINE

## 2019-01-01 PROCEDURE — 85025 COMPLETE CBC W/AUTO DIFF WBC: CPT

## 2019-01-01 PROCEDURE — 97116 GAIT TRAINING THERAPY: CPT

## 2019-01-01 PROCEDURE — 4040F PNEUMOC VAC/ADMIN/RCVD: CPT | Performed by: NURSE PRACTITIONER

## 2019-01-01 PROCEDURE — 93283 PRGRMG EVAL IMPLANTABLE DFB: CPT | Performed by: INTERNAL MEDICINE

## 2019-01-01 PROCEDURE — 85027 COMPLETE CBC AUTOMATED: CPT

## 2019-01-01 PROCEDURE — 99291 CRITICAL CARE FIRST HOUR: CPT | Performed by: INTERNAL MEDICINE

## 2019-01-01 PROCEDURE — 97110 THERAPEUTIC EXERCISES: CPT

## 2019-01-01 PROCEDURE — 1036F TOBACCO NON-USER: CPT | Performed by: NURSE PRACTITIONER

## 2019-01-01 PROCEDURE — 51702 INSERT TEMP BLADDER CATH: CPT

## 2019-01-01 PROCEDURE — 93000 ELECTROCARDIOGRAM COMPLETE: CPT | Performed by: NURSE PRACTITIONER

## 2019-01-01 PROCEDURE — 94761 N-INVAS EAR/PLS OXIMETRY MLT: CPT

## 2019-01-01 PROCEDURE — G8484 FLU IMMUNIZE NO ADMIN: HCPCS | Performed by: INTERNAL MEDICINE

## 2019-01-01 PROCEDURE — 1036F TOBACCO NON-USER: CPT | Performed by: INTERNAL MEDICINE

## 2019-01-01 PROCEDURE — 73120 X-RAY EXAM OF HAND: CPT

## 2019-01-01 PROCEDURE — 97530 THERAPEUTIC ACTIVITIES: CPT

## 2019-01-01 PROCEDURE — 80076 HEPATIC FUNCTION PANEL: CPT

## 2019-01-01 PROCEDURE — 99284 EMERGENCY DEPT VISIT MOD MDM: CPT

## 2019-01-01 PROCEDURE — 1123F ACP DISCUSS/DSCN MKR DOCD: CPT | Performed by: NURSE PRACTITIONER

## 2019-01-01 PROCEDURE — 82306 VITAMIN D 25 HYDROXY: CPT

## 2019-01-01 PROCEDURE — 93005 ELECTROCARDIOGRAM TRACING: CPT | Performed by: PHYSICIAN ASSISTANT

## 2019-01-01 PROCEDURE — 1090F PRES/ABSN URINE INCON ASSESS: CPT | Performed by: INTERNAL MEDICINE

## 2019-01-01 PROCEDURE — 84443 ASSAY THYROID STIM HORMONE: CPT

## 2019-01-01 PROCEDURE — 1101F PT FALLS ASSESS-DOCD LE1/YR: CPT | Performed by: NURSE PRACTITIONER

## 2019-01-01 PROCEDURE — 2580000003 HC RX 258

## 2019-01-01 PROCEDURE — 1101F PT FALLS ASSESS-DOCD LE1/YR: CPT | Performed by: INTERNAL MEDICINE

## 2019-01-01 PROCEDURE — 86038 ANTINUCLEAR ANTIBODIES: CPT

## 2019-01-01 PROCEDURE — 93295 DEV INTERROG REMOTE 1/2/MLT: CPT | Performed by: INTERNAL MEDICINE

## 2019-01-01 PROCEDURE — 83605 ASSAY OF LACTIC ACID: CPT

## 2019-01-01 PROCEDURE — 84484 ASSAY OF TROPONIN QUANT: CPT

## 2019-01-01 PROCEDURE — G8427 DOCREV CUR MEDS BY ELIG CLIN: HCPCS | Performed by: NURSE PRACTITIONER

## 2019-01-01 PROCEDURE — APPNB30 APP NON BILLABLE TIME 0-30 MINS: Performed by: PHYSICIAN ASSISTANT

## 2019-01-01 PROCEDURE — 99214 OFFICE O/P EST MOD 30 MIN: CPT | Performed by: INTERNAL MEDICINE

## 2019-01-01 PROCEDURE — 86376 MICROSOMAL ANTIBODY EACH: CPT

## 2019-01-01 PROCEDURE — 99212 OFFICE O/P EST SF 10 MIN: CPT | Performed by: INTERNAL MEDICINE

## 2019-01-01 PROCEDURE — 2709999900 HC NON-CHARGEABLE SUPPLY: Performed by: ORTHOPAEDIC SURGERY

## 2019-01-01 PROCEDURE — 96375 TX/PRO/DX INJ NEW DRUG ADDON: CPT

## 2019-01-01 PROCEDURE — 82728 ASSAY OF FERRITIN: CPT

## 2019-01-01 PROCEDURE — 99291 CRITICAL CARE FIRST HOUR: CPT

## 2019-01-01 PROCEDURE — 4500000026 HC ED CRITICAL CARE PROCEDURE

## 2019-01-01 PROCEDURE — G8417 CALC BMI ABV UP PARAM F/U: HCPCS | Performed by: INTERNAL MEDICINE

## 2019-01-01 PROCEDURE — 73501 X-RAY EXAM HIP UNI 1 VIEW: CPT

## 2019-01-01 PROCEDURE — 2500000003 HC RX 250 WO HCPCS: Performed by: ORTHOPAEDIC SURGERY

## 2019-01-01 PROCEDURE — 96374 THER/PROPH/DIAG INJ IV PUSH: CPT

## 2019-01-01 PROCEDURE — 1090F PRES/ABSN URINE INCON ASSESS: CPT | Performed by: NURSE PRACTITIONER

## 2019-01-01 PROCEDURE — 80047 BASIC METABLC PNL IONIZED CA: CPT

## 2019-01-01 PROCEDURE — 3600000014 HC SURGERY LEVEL 4 ADDTL 15MIN: Performed by: ORTHOPAEDIC SURGERY

## 2019-01-01 PROCEDURE — 85610 PROTHROMBIN TIME: CPT

## 2019-01-01 PROCEDURE — 2500000003 HC RX 250 WO HCPCS: Performed by: ANESTHESIOLOGY

## 2019-01-01 PROCEDURE — G8484 FLU IMMUNIZE NO ADMIN: HCPCS | Performed by: NURSE PRACTITIONER

## 2019-01-01 PROCEDURE — 82803 BLOOD GASES ANY COMBINATION: CPT

## 2019-01-01 PROCEDURE — 93290 INTERROG DEV EVAL ICPMS IP: CPT | Performed by: INTERNAL MEDICINE

## 2019-01-01 PROCEDURE — 99215 OFFICE O/P EST HI 40 MIN: CPT | Performed by: INTERNAL MEDICINE

## 2019-01-01 PROCEDURE — 99024 POSTOP FOLLOW-UP VISIT: CPT | Performed by: ORTHOPAEDIC SURGERY

## 2019-01-01 PROCEDURE — 82103 ALPHA-1-ANTITRYPSIN TOTAL: CPT

## 2019-01-01 PROCEDURE — 7100000000 HC PACU RECOVERY - FIRST 15 MIN: Performed by: ORTHOPAEDIC SURGERY

## 2019-01-01 PROCEDURE — 86901 BLOOD TYPING SEROLOGIC RH(D): CPT

## 2019-01-01 PROCEDURE — 6360000002 HC RX W HCPCS: Performed by: ANESTHESIOLOGY

## 2019-01-01 PROCEDURE — 93010 ELECTROCARDIOGRAM REPORT: CPT | Performed by: INTERNAL MEDICINE

## 2019-01-01 PROCEDURE — 93880 EXTRACRANIAL BILAT STUDY: CPT

## 2019-01-01 PROCEDURE — 97161 PT EVAL LOW COMPLEX 20 MIN: CPT

## 2019-01-01 PROCEDURE — APPSS30 APP SPLIT SHARED TIME 16-30 MINUTES: Performed by: PHYSICIAN ASSISTANT

## 2019-01-01 PROCEDURE — 3700000001 HC ADD 15 MINUTES (ANESTHESIA): Performed by: ORTHOPAEDIC SURGERY

## 2019-01-01 PROCEDURE — 83550 IRON BINDING TEST: CPT

## 2019-01-01 PROCEDURE — 93971 EXTREMITY STUDY: CPT

## 2019-01-01 PROCEDURE — C1776 JOINT DEVICE (IMPLANTABLE): HCPCS | Performed by: ORTHOPAEDIC SURGERY

## 2019-01-01 PROCEDURE — 86900 BLOOD TYPING SEROLOGIC ABO: CPT

## 2019-01-01 PROCEDURE — 81001 URINALYSIS AUTO W/SCOPE: CPT

## 2019-01-01 PROCEDURE — 99203 OFFICE O/P NEW LOW 30 MIN: CPT | Performed by: INTERNAL MEDICINE

## 2019-01-01 PROCEDURE — 72170 X-RAY EXAM OF PELVIS: CPT

## 2019-01-01 PROCEDURE — 93306 TTE W/DOPPLER COMPLETE: CPT

## 2019-01-01 PROCEDURE — 0QS604Z REPOSITION RIGHT UPPER FEMUR WITH INTERNAL FIXATION DEVICE, OPEN APPROACH: ICD-10-PCS | Performed by: ORTHOPAEDIC SURGERY

## 2019-01-01 PROCEDURE — 87340 HEPATITIS B SURFACE AG IA: CPT

## 2019-01-01 PROCEDURE — 2580000003 HC RX 258: Performed by: NURSE PRACTITIONER

## 2019-01-01 PROCEDURE — 6360000002 HC RX W HCPCS: Performed by: PHYSICIAN ASSISTANT

## 2019-01-01 PROCEDURE — 83540 ASSAY OF IRON: CPT

## 2019-01-01 PROCEDURE — 3700000000 HC ANESTHESIA ATTENDED CARE: Performed by: ORTHOPAEDIC SURGERY

## 2019-01-01 PROCEDURE — 76705 ECHO EXAM OF ABDOMEN: CPT

## 2019-01-01 DEVICE — STEM FEM SZ 13 L135MM NK L38.5MM 135DEG 41.5MM OFFSET STD: Type: IMPLANTABLE DEVICE | Site: HIP | Status: FUNCTIONAL

## 2019-01-01 DEVICE — IMPL HIP FEM HEAD SELF CTR BIPLR 44X28MM: Type: IMPLANTABLE DEVICE | Site: HIP | Status: FUNCTIONAL

## 2019-01-01 DEVICE — HEAD FEM DIA28MM NK L+1.5MM HIP MTL ON MTL 12/14 TAPR: Type: IMPLANTABLE DEVICE | Site: HIP | Status: FUNCTIONAL

## 2019-01-01 RX ORDER — PRAVASTATIN SODIUM 40 MG
TABLET ORAL
Qty: 90 TABLET | Refills: 5 | Status: SHIPPED | OUTPATIENT
Start: 2019-01-01 | End: 2019-01-01

## 2019-01-01 RX ORDER — FENTANYL CITRATE 50 UG/ML
INJECTION, SOLUTION INTRAMUSCULAR; INTRAVENOUS PRN
Status: DISCONTINUED | OUTPATIENT
Start: 2019-01-01 | End: 2019-01-01 | Stop reason: SDUPTHER

## 2019-01-01 RX ORDER — SODIUM CHLORIDE 0.9 % (FLUSH) 0.9 %
10 SYRINGE (ML) INJECTION PRN
Status: DISCONTINUED | OUTPATIENT
Start: 2019-01-01 | End: 2019-01-01 | Stop reason: SDUPTHER

## 2019-01-01 RX ORDER — LISINOPRIL 2.5 MG/1
TABLET ORAL
Qty: 90 TABLET | Refills: 3 | Status: SHIPPED | OUTPATIENT
Start: 2019-01-01 | End: 2019-01-01 | Stop reason: SDUPTHER

## 2019-01-01 RX ORDER — HYDRALAZINE HYDROCHLORIDE 20 MG/ML
5 INJECTION INTRAMUSCULAR; INTRAVENOUS EVERY 10 MIN PRN
Status: DISCONTINUED | OUTPATIENT
Start: 2019-01-01 | End: 2019-01-01 | Stop reason: HOSPADM

## 2019-01-01 RX ORDER — OXYCODONE HYDROCHLORIDE AND ACETAMINOPHEN 5; 325 MG/1; MG/1
1 TABLET ORAL EVERY 4 HOURS PRN
Status: DISCONTINUED | OUTPATIENT
Start: 2019-01-01 | End: 2019-01-01 | Stop reason: HOSPADM

## 2019-01-01 RX ORDER — ONDANSETRON 2 MG/ML
4 INJECTION INTRAMUSCULAR; INTRAVENOUS EVERY 6 HOURS PRN
Status: DISCONTINUED | OUTPATIENT
Start: 2019-01-01 | End: 2019-01-01 | Stop reason: HOSPADM

## 2019-01-01 RX ORDER — LIDOCAINE HYDROCHLORIDE 20 MG/ML
INJECTION, SOLUTION INFILTRATION; PERINEURAL PRN
Status: DISCONTINUED | OUTPATIENT
Start: 2019-01-01 | End: 2019-01-01 | Stop reason: SDUPTHER

## 2019-01-01 RX ORDER — M-VIT,TX,IRON,MINS/CALC/FOLIC 27MG-0.4MG
1 TABLET ORAL DAILY
Status: DISCONTINUED | OUTPATIENT
Start: 2019-01-01 | End: 2019-01-01 | Stop reason: HOSPADM

## 2019-01-01 RX ORDER — HYDROCHLOROTHIAZIDE 25 MG/1
25 TABLET ORAL
Qty: 30 TABLET | Refills: 11 | Status: SHIPPED | OUTPATIENT
Start: 2019-01-01 | End: 2019-01-01 | Stop reason: SDUPTHER

## 2019-01-01 RX ORDER — AMIODARONE HYDROCHLORIDE 200 MG/1
200 TABLET ORAL DAILY
Status: DISCONTINUED | OUTPATIENT
Start: 2019-01-01 | End: 2019-01-01 | Stop reason: HOSPADM

## 2019-01-01 RX ORDER — PROPOFOL 10 MG/ML
INJECTION, EMULSION INTRAVENOUS PRN
Status: DISCONTINUED | OUTPATIENT
Start: 2019-01-01 | End: 2019-01-01 | Stop reason: SDUPTHER

## 2019-01-01 RX ORDER — MORPHINE SULFATE 4 MG/ML
4 INJECTION, SOLUTION INTRAMUSCULAR; INTRAVENOUS ONCE
Status: COMPLETED | OUTPATIENT
Start: 2019-01-01 | End: 2019-01-01

## 2019-01-01 RX ORDER — POTASSIUM CHLORIDE 750 MG/1
TABLET, EXTENDED RELEASE ORAL
Qty: 180 TABLET | Refills: 1 | Status: SHIPPED | OUTPATIENT
Start: 2019-01-01 | End: 2019-01-01 | Stop reason: ALTCHOICE

## 2019-01-01 RX ORDER — PROMETHAZINE HYDROCHLORIDE 25 MG/ML
6.25 INJECTION, SOLUTION INTRAMUSCULAR; INTRAVENOUS
Status: DISCONTINUED | OUTPATIENT
Start: 2019-01-01 | End: 2019-01-01 | Stop reason: HOSPADM

## 2019-01-01 RX ORDER — POTASSIUM CHLORIDE 750 MG/1
20 TABLET, EXTENDED RELEASE ORAL DAILY
Qty: 60 TABLET | Refills: 5 | Status: SHIPPED | OUTPATIENT
Start: 2019-01-01 | End: 2019-01-01 | Stop reason: SDUPTHER

## 2019-01-01 RX ORDER — AMIODARONE HYDROCHLORIDE 200 MG/1
100 TABLET ORAL DAILY
Qty: 45 TABLET | Refills: 1 | Status: SHIPPED | OUTPATIENT
Start: 2019-01-01

## 2019-01-01 RX ORDER — POTASSIUM CHLORIDE 1.5 G/1.77G
20 POWDER, FOR SOLUTION ORAL DAILY
COMMUNITY
End: 2019-01-01 | Stop reason: SDUPTHER

## 2019-01-01 RX ORDER — METOPROLOL TARTRATE 50 MG/1
TABLET, FILM COATED ORAL
Qty: 270 TABLET | Refills: 0 | Status: SHIPPED | OUTPATIENT
Start: 2019-01-01

## 2019-01-01 RX ORDER — MORPHINE SULFATE 4 MG/ML
4 INJECTION, SOLUTION INTRAMUSCULAR; INTRAVENOUS
Status: DISCONTINUED | OUTPATIENT
Start: 2019-01-01 | End: 2019-01-01

## 2019-01-01 RX ORDER — M-VIT,TX,IRON,MINS/CALC/FOLIC 27MG-0.4MG
1 TABLET ORAL DAILY
COMMUNITY

## 2019-01-01 RX ORDER — HYDROCHLOROTHIAZIDE 25 MG/1
25 TABLET ORAL EVERY OTHER DAY
Status: DISCONTINUED | OUTPATIENT
Start: 2019-01-01 | End: 2019-01-01 | Stop reason: HOSPADM

## 2019-01-01 RX ORDER — ACETAMINOPHEN 325 MG/1
650 TABLET ORAL EVERY 4 HOURS PRN
Status: DISCONTINUED | OUTPATIENT
Start: 2019-01-01 | End: 2019-01-01 | Stop reason: HOSPADM

## 2019-01-01 RX ORDER — LATANOPROST 50 UG/ML
1 SOLUTION/ DROPS OPHTHALMIC NIGHTLY
Status: DISCONTINUED | OUTPATIENT
Start: 2019-01-01 | End: 2019-01-01 | Stop reason: HOSPADM

## 2019-01-01 RX ORDER — LABETALOL HYDROCHLORIDE 5 MG/ML
5 INJECTION, SOLUTION INTRAVENOUS EVERY 10 MIN PRN
Status: DISCONTINUED | OUTPATIENT
Start: 2019-01-01 | End: 2019-01-01 | Stop reason: HOSPADM

## 2019-01-01 RX ORDER — AMIODARONE HYDROCHLORIDE 200 MG/1
200 TABLET ORAL DAILY
Qty: 90 TABLET | Refills: 1 | Status: SHIPPED | OUTPATIENT
Start: 2019-01-01 | End: 2019-01-01 | Stop reason: SDUPTHER

## 2019-01-01 RX ORDER — AMIODARONE HYDROCHLORIDE 200 MG/1
TABLET ORAL
Qty: 90 TABLET | Refills: 1 | Status: SHIPPED | OUTPATIENT
Start: 2019-01-01 | End: 2019-01-01 | Stop reason: SDUPTHER

## 2019-01-01 RX ORDER — ROCURONIUM BROMIDE 10 MG/ML
INJECTION, SOLUTION INTRAVENOUS PRN
Status: DISCONTINUED | OUTPATIENT
Start: 2019-01-01 | End: 2019-01-01 | Stop reason: SDUPTHER

## 2019-01-01 RX ORDER — SODIUM CHLORIDE 9 MG/ML
INJECTION, SOLUTION INTRAVENOUS
Status: COMPLETED
Start: 2019-01-01 | End: 2019-01-01

## 2019-01-01 RX ORDER — DIPHENHYDRAMINE HYDROCHLORIDE 50 MG/ML
12.5 INJECTION INTRAMUSCULAR; INTRAVENOUS
Status: DISCONTINUED | OUTPATIENT
Start: 2019-01-01 | End: 2019-01-01 | Stop reason: HOSPADM

## 2019-01-01 RX ORDER — PRAVASTATIN SODIUM 40 MG
40 TABLET ORAL DAILY
Qty: 30 TABLET | Refills: 5 | Status: SHIPPED | OUTPATIENT
Start: 2019-01-01 | End: 2019-01-01 | Stop reason: SDUPTHER

## 2019-01-01 RX ORDER — OXYCODONE HYDROCHLORIDE AND ACETAMINOPHEN 5; 325 MG/1; MG/1
2 TABLET ORAL PRN
Status: DISCONTINUED | OUTPATIENT
Start: 2019-01-01 | End: 2019-01-01 | Stop reason: HOSPADM

## 2019-01-01 RX ORDER — MORPHINE SULFATE 2 MG/ML
1 INJECTION, SOLUTION INTRAMUSCULAR; INTRAVENOUS EVERY 5 MIN PRN
Status: DISCONTINUED | OUTPATIENT
Start: 2019-01-01 | End: 2019-01-01 | Stop reason: HOSPADM

## 2019-01-01 RX ORDER — ASPIRIN 81 MG/1
81 TABLET ORAL DAILY
Status: DISCONTINUED | OUTPATIENT
Start: 2019-01-01 | End: 2019-01-01 | Stop reason: HOSPADM

## 2019-01-01 RX ORDER — METOPROLOL TARTRATE 50 MG/1
TABLET, FILM COATED ORAL
Qty: 270 TABLET | Refills: 3 | Status: SHIPPED | OUTPATIENT
Start: 2019-01-01 | End: 2019-01-01 | Stop reason: SDUPTHER

## 2019-01-01 RX ORDER — LISINOPRIL 2.5 MG/1
TABLET ORAL
Qty: 90 TABLET | Refills: 0 | Status: SHIPPED | OUTPATIENT
Start: 2019-01-01

## 2019-01-01 RX ORDER — OXYCODONE HYDROCHLORIDE AND ACETAMINOPHEN 5; 325 MG/1; MG/1
1 TABLET ORAL PRN
Status: DISCONTINUED | OUTPATIENT
Start: 2019-01-01 | End: 2019-01-01 | Stop reason: HOSPADM

## 2019-01-01 RX ORDER — OXYCODONE HYDROCHLORIDE AND ACETAMINOPHEN 5; 325 MG/1; MG/1
1 TABLET ORAL EVERY 6 HOURS PRN
Qty: 12 TABLET | Refills: 0 | Status: SHIPPED | OUTPATIENT
Start: 2019-01-01 | End: 2019-01-01

## 2019-01-01 RX ORDER — LISINOPRIL 2.5 MG/1
2.5 TABLET ORAL DAILY
Status: DISCONTINUED | OUTPATIENT
Start: 2019-01-01 | End: 2019-01-01 | Stop reason: HOSPADM

## 2019-01-01 RX ORDER — SIMVASTATIN 40 MG
40 TABLET ORAL NIGHTLY
Qty: 30 TABLET | Refills: 5 | Status: CANCELLED | OUTPATIENT
Start: 2019-01-01

## 2019-01-01 RX ORDER — FUROSEMIDE 40 MG/1
40 TABLET ORAL DAILY
Qty: 30 TABLET | Refills: 11 | Status: SHIPPED | OUTPATIENT
Start: 2019-01-01 | End: 2019-01-01 | Stop reason: DRUGHIGH

## 2019-01-01 RX ORDER — MORPHINE SULFATE 2 MG/ML
2 INJECTION, SOLUTION INTRAMUSCULAR; INTRAVENOUS
Status: DISCONTINUED | OUTPATIENT
Start: 2019-01-01 | End: 2019-01-01

## 2019-01-01 RX ORDER — SODIUM CHLORIDE 0.9 % (FLUSH) 0.9 %
10 SYRINGE (ML) INJECTION PRN
Status: DISCONTINUED | OUTPATIENT
Start: 2019-01-01 | End: 2019-01-01 | Stop reason: HOSPADM

## 2019-01-01 RX ORDER — SENNA AND DOCUSATE SODIUM 50; 8.6 MG/1; MG/1
1 TABLET, FILM COATED ORAL 2 TIMES DAILY
DISCHARGE
Start: 2019-01-01 | End: 2019-01-01

## 2019-01-01 RX ORDER — SODIUM CHLORIDE 0.9 % (FLUSH) 0.9 %
10 SYRINGE (ML) INJECTION EVERY 12 HOURS SCHEDULED
Status: DISCONTINUED | OUTPATIENT
Start: 2019-01-01 | End: 2019-01-01 | Stop reason: HOSPADM

## 2019-01-01 RX ORDER — SODIUM CHLORIDE, SODIUM LACTATE, POTASSIUM CHLORIDE, CALCIUM CHLORIDE 600; 310; 30; 20 MG/100ML; MG/100ML; MG/100ML; MG/100ML
INJECTION, SOLUTION INTRAVENOUS CONTINUOUS PRN
Status: DISCONTINUED | OUTPATIENT
Start: 2019-01-01 | End: 2019-01-01 | Stop reason: SDUPTHER

## 2019-01-01 RX ORDER — ONDANSETRON 2 MG/ML
INJECTION INTRAMUSCULAR; INTRAVENOUS PRN
Status: DISCONTINUED | OUTPATIENT
Start: 2019-01-01 | End: 2019-01-01 | Stop reason: SDUPTHER

## 2019-01-01 RX ORDER — POTASSIUM CHLORIDE 1.5 G/1.77G
20 POWDER, FOR SOLUTION ORAL DAILY
Qty: 30 EACH | Refills: 11 | Status: SHIPPED | OUTPATIENT
Start: 2019-01-01 | End: 2019-01-01 | Stop reason: CLARIF

## 2019-01-01 RX ORDER — SODIUM CHLORIDE 0.9 % (FLUSH) 0.9 %
10 SYRINGE (ML) INJECTION EVERY 12 HOURS SCHEDULED
Status: DISCONTINUED | OUTPATIENT
Start: 2019-01-01 | End: 2019-01-01 | Stop reason: SDUPTHER

## 2019-01-01 RX ORDER — SODIUM CHLORIDE 9 MG/ML
INJECTION, SOLUTION INTRAVENOUS CONTINUOUS
Status: DISCONTINUED | OUTPATIENT
Start: 2019-01-01 | End: 2019-01-01 | Stop reason: HOSPADM

## 2019-01-01 RX ORDER — PRAVASTATIN SODIUM 40 MG
40 TABLET ORAL DAILY
Status: DISCONTINUED | OUTPATIENT
Start: 2019-01-01 | End: 2019-01-01 | Stop reason: HOSPADM

## 2019-01-01 RX ORDER — SENNA AND DOCUSATE SODIUM 50; 8.6 MG/1; MG/1
1 TABLET, FILM COATED ORAL 2 TIMES DAILY
Status: DISCONTINUED | OUTPATIENT
Start: 2019-01-01 | End: 2019-01-01 | Stop reason: HOSPADM

## 2019-01-01 RX ORDER — HYDROCHLOROTHIAZIDE 25 MG/1
TABLET ORAL
Qty: 30 TABLET | Refills: 11
Start: 2019-01-01 | End: 2019-01-01 | Stop reason: CLARIF

## 2019-01-01 RX ORDER — VERAPAMIL HYDROCHLORIDE 120 MG/1
60 TABLET, FILM COATED ORAL 2 TIMES DAILY
Status: DISCONTINUED | OUTPATIENT
Start: 2019-01-01 | End: 2019-01-01 | Stop reason: HOSPADM

## 2019-01-01 RX ORDER — MORPHINE SULFATE 2 MG/ML
2 INJECTION, SOLUTION INTRAMUSCULAR; INTRAVENOUS EVERY 5 MIN PRN
Status: DISCONTINUED | OUTPATIENT
Start: 2019-01-01 | End: 2019-01-01 | Stop reason: HOSPADM

## 2019-01-01 RX ORDER — FUROSEMIDE 40 MG/1
20 TABLET ORAL DAILY
Qty: 90 TABLET | Refills: 1 | Status: SHIPPED | OUTPATIENT
Start: 2019-01-01

## 2019-01-01 RX ORDER — OXYCODONE HYDROCHLORIDE AND ACETAMINOPHEN 5; 325 MG/1; MG/1
2 TABLET ORAL EVERY 4 HOURS PRN
Status: DISCONTINUED | OUTPATIENT
Start: 2019-01-01 | End: 2019-01-01 | Stop reason: HOSPADM

## 2019-01-01 RX ORDER — MEPERIDINE HYDROCHLORIDE 50 MG/ML
12.5 INJECTION INTRAMUSCULAR; INTRAVENOUS; SUBCUTANEOUS EVERY 5 MIN PRN
Status: DISCONTINUED | OUTPATIENT
Start: 2019-01-01 | End: 2019-01-01 | Stop reason: HOSPADM

## 2019-01-01 RX ORDER — FUROSEMIDE 40 MG/1
40 TABLET ORAL DAILY
COMMUNITY
End: 2019-01-01 | Stop reason: SDUPTHER

## 2019-01-01 RX ORDER — MAGNESIUM HYDROXIDE 1200 MG/15ML
LIQUID ORAL CONTINUOUS PRN
Status: COMPLETED | OUTPATIENT
Start: 2019-01-01 | End: 2019-01-01

## 2019-01-01 RX ORDER — FAMOTIDINE 20 MG/1
20 TABLET, FILM COATED ORAL DAILY
Status: DISCONTINUED | OUTPATIENT
Start: 2019-01-01 | End: 2019-01-01 | Stop reason: HOSPADM

## 2019-01-01 RX ORDER — ONDANSETRON 2 MG/ML
4 INJECTION INTRAMUSCULAR; INTRAVENOUS PRN
Status: DISCONTINUED | OUTPATIENT
Start: 2019-01-01 | End: 2019-01-01 | Stop reason: HOSPADM

## 2019-01-01 RX ORDER — POTASSIUM CHLORIDE 750 MG/1
10 TABLET, EXTENDED RELEASE ORAL DAILY
Qty: 30 TABLET | Refills: 2 | Status: SHIPPED | OUTPATIENT
Start: 2019-01-01

## 2019-01-01 RX ADMIN — FENTANYL CITRATE 25 MCG: 50 INJECTION INTRAMUSCULAR; INTRAVENOUS at 09:03

## 2019-01-01 RX ADMIN — FAMOTIDINE 20 MG: 20 TABLET ORAL at 08:37

## 2019-01-01 RX ADMIN — LATANOPROST 1 DROP: 50 SOLUTION OPHTHALMIC at 20:09

## 2019-01-01 RX ADMIN — METOPROLOL TARTRATE 75 MG: 50 TABLET ORAL at 20:23

## 2019-01-01 RX ADMIN — ACETAMINOPHEN 650 MG: 325 TABLET ORAL at 23:56

## 2019-01-01 RX ADMIN — Medication 2 G: at 08:21

## 2019-01-01 RX ADMIN — PROPOFOL 80 MG: 10 INJECTION, EMULSION INTRAVENOUS at 08:14

## 2019-01-01 RX ADMIN — ROCURONIUM BROMIDE 50 MG: 10 SOLUTION INTRAVENOUS at 08:15

## 2019-01-01 RX ADMIN — SENNOSIDES,DOCUSATE SODIUM 1 TABLET: 8.6; 5 TABLET, FILM COATED ORAL at 12:32

## 2019-01-01 RX ADMIN — LISINOPRIL 2.5 MG: 2.5 TABLET ORAL at 21:00

## 2019-01-01 RX ADMIN — LIDOCAINE HYDROCHLORIDE 60 MG: 20 INJECTION, SOLUTION INFILTRATION; PERINEURAL at 08:14

## 2019-01-01 RX ADMIN — AMIODARONE HYDROCHLORIDE 200 MG: 200 TABLET ORAL at 08:45

## 2019-01-01 RX ADMIN — HYDROCHLOROTHIAZIDE 25 MG: 25 TABLET ORAL at 08:37

## 2019-01-01 RX ADMIN — SENNOSIDES,DOCUSATE SODIUM 1 TABLET: 8.6; 5 TABLET, FILM COATED ORAL at 20:09

## 2019-01-01 RX ADMIN — PHENYLEPHRINE HYDROCHLORIDE 100 MCG: 10 INJECTION INTRAVENOUS at 08:14

## 2019-01-01 RX ADMIN — PRAVASTATIN SODIUM 40 MG: 40 TABLET ORAL at 21:00

## 2019-01-01 RX ADMIN — VERAPAMIL HYDROCHLORIDE 60 MG: 120 TABLET, FILM COATED ORAL at 12:27

## 2019-01-01 RX ADMIN — Medication 10 ML: at 12:27

## 2019-01-01 RX ADMIN — OXYCODONE HYDROCHLORIDE AND ACETAMINOPHEN 2 TABLET: 5; 325 TABLET ORAL at 12:24

## 2019-01-01 RX ADMIN — HYDROCHLOROTHIAZIDE 25 MG: 25 TABLET ORAL at 08:43

## 2019-01-01 RX ADMIN — SERTRALINE HYDROCHLORIDE 150 MG: 50 TABLET ORAL at 08:41

## 2019-01-01 RX ADMIN — LATANOPROST 1 DROP: 50 SOLUTION OPHTHALMIC at 22:08

## 2019-01-01 RX ADMIN — AMIODARONE HYDROCHLORIDE 200 MG: 200 TABLET ORAL at 10:04

## 2019-01-01 RX ADMIN — SENNOSIDES,DOCUSATE SODIUM 1 TABLET: 8.6; 5 TABLET, FILM COATED ORAL at 08:41

## 2019-01-01 RX ADMIN — VERAPAMIL HYDROCHLORIDE 60 MG: 120 TABLET, FILM COATED ORAL at 08:44

## 2019-01-01 RX ADMIN — SODIUM CHLORIDE: 9 INJECTION, SOLUTION INTRAVENOUS at 15:18

## 2019-01-01 RX ADMIN — OXYCODONE HYDROCHLORIDE AND ACETAMINOPHEN 1 TABLET: 5; 325 TABLET ORAL at 12:28

## 2019-01-01 RX ADMIN — CEFAZOLIN 1 G: 1 INJECTION, POWDER, FOR SOLUTION INTRAMUSCULAR; INTRAVENOUS at 17:38

## 2019-01-01 RX ADMIN — HYDROMORPHONE HYDROCHLORIDE 1 MG: 1 INJECTION, SOLUTION INTRAMUSCULAR; INTRAVENOUS; SUBCUTANEOUS at 02:20

## 2019-01-01 RX ADMIN — MORPHINE SULFATE 4 MG: 4 INJECTION INTRAVENOUS at 01:43

## 2019-01-01 RX ADMIN — FENTANYL CITRATE 25 MCG: 50 INJECTION INTRAMUSCULAR; INTRAVENOUS at 08:51

## 2019-01-01 RX ADMIN — SERTRALINE HYDROCHLORIDE 150 MG: 50 TABLET ORAL at 08:45

## 2019-01-01 RX ADMIN — VITAMIN D, TAB 1000IU (100/BT) 1000 UNITS: 25 TAB at 08:45

## 2019-01-01 RX ADMIN — METOPROLOL TARTRATE 75 MG: 50 TABLET ORAL at 08:39

## 2019-01-01 RX ADMIN — Medication 1 TABLET: at 08:46

## 2019-01-01 RX ADMIN — ACETAMINOPHEN 650 MG: 325 TABLET ORAL at 19:04

## 2019-01-01 RX ADMIN — OXYCODONE HYDROCHLORIDE AND ACETAMINOPHEN 2 TABLET: 5; 325 TABLET ORAL at 04:54

## 2019-01-01 RX ADMIN — ASPIRIN 81 MG: 81 TABLET, COATED ORAL at 08:37

## 2019-01-01 RX ADMIN — OXYCODONE HYDROCHLORIDE AND ACETAMINOPHEN 2 TABLET: 5; 325 TABLET ORAL at 04:25

## 2019-01-01 RX ADMIN — Medication 1 TABLET: at 08:44

## 2019-01-01 RX ADMIN — OXYCODONE HYDROCHLORIDE AND ACETAMINOPHEN 1 TABLET: 5; 325 TABLET ORAL at 21:08

## 2019-01-01 RX ADMIN — SERTRALINE HYDROCHLORIDE 150 MG: 50 TABLET ORAL at 12:28

## 2019-01-01 RX ADMIN — PRAVASTATIN SODIUM 40 MG: 40 TABLET ORAL at 10:04

## 2019-01-01 RX ADMIN — OXYCODONE HYDROCHLORIDE AND ACETAMINOPHEN 1 TABLET: 5; 325 TABLET ORAL at 17:08

## 2019-01-01 RX ADMIN — METOPROLOL TARTRATE 75 MG: 50 TABLET ORAL at 20:09

## 2019-01-01 RX ADMIN — OXYCODONE HYDROCHLORIDE AND ACETAMINOPHEN 2 TABLET: 5; 325 TABLET ORAL at 10:04

## 2019-01-01 RX ADMIN — SODIUM CHLORIDE: 9 INJECTION, SOLUTION INTRAVENOUS at 22:12

## 2019-01-01 RX ADMIN — SERTRALINE HYDROCHLORIDE 150 MG: 50 TABLET ORAL at 08:37

## 2019-01-01 RX ADMIN — Medication 10 ML: at 08:47

## 2019-01-01 RX ADMIN — Medication 1 TABLET: at 10:04

## 2019-01-01 RX ADMIN — FAMOTIDINE 20 MG: 20 TABLET ORAL at 08:46

## 2019-01-01 RX ADMIN — ENOXAPARIN SODIUM 40 MG: 40 INJECTION SUBCUTANEOUS at 12:27

## 2019-01-01 RX ADMIN — METOPROLOL TARTRATE 75 MG: 50 TABLET ORAL at 21:00

## 2019-01-01 RX ADMIN — VERAPAMIL HYDROCHLORIDE 60 MG: 120 TABLET, FILM COATED ORAL at 08:37

## 2019-01-01 RX ADMIN — PRAVASTATIN SODIUM 40 MG: 40 TABLET ORAL at 22:08

## 2019-01-01 RX ADMIN — LISINOPRIL 2.5 MG: 2.5 TABLET ORAL at 20:09

## 2019-01-01 RX ADMIN — SODIUM CHLORIDE: 9 INJECTION, SOLUTION INTRAVENOUS at 06:48

## 2019-01-01 RX ADMIN — SUGAMMADEX 100 MG: 100 INJECTION, SOLUTION INTRAVENOUS at 09:41

## 2019-01-01 RX ADMIN — SENNOSIDES,DOCUSATE SODIUM 1 TABLET: 8.6; 5 TABLET, FILM COATED ORAL at 22:07

## 2019-01-01 RX ADMIN — ENOXAPARIN SODIUM 40 MG: 40 INJECTION SUBCUTANEOUS at 08:46

## 2019-01-01 RX ADMIN — Medication 10 ML: at 20:23

## 2019-01-01 RX ADMIN — VITAMIN D, TAB 1000IU (100/BT) 1000 UNITS: 25 TAB at 12:28

## 2019-01-01 RX ADMIN — AMIODARONE HYDROCHLORIDE 200 MG: 200 TABLET ORAL at 12:28

## 2019-01-01 RX ADMIN — SENNOSIDES,DOCUSATE SODIUM 1 TABLET: 8.6; 5 TABLET, FILM COATED ORAL at 08:45

## 2019-01-01 RX ADMIN — VERAPAMIL HYDROCHLORIDE 60 MG: 120 TABLET, FILM COATED ORAL at 20:09

## 2019-01-01 RX ADMIN — METOPROLOL TARTRATE 75 MG: 50 TABLET ORAL at 12:29

## 2019-01-01 RX ADMIN — VERAPAMIL HYDROCHLORIDE 60 MG: 120 TABLET, FILM COATED ORAL at 08:46

## 2019-01-01 RX ADMIN — VERAPAMIL HYDROCHLORIDE 60 MG: 120 TABLET, FILM COATED ORAL at 21:08

## 2019-01-01 RX ADMIN — ENOXAPARIN SODIUM 40 MG: 40 INJECTION SUBCUTANEOUS at 08:36

## 2019-01-01 RX ADMIN — HYDROMORPHONE HYDROCHLORIDE 0.5 MG: 1 INJECTION, SOLUTION INTRAMUSCULAR; INTRAVENOUS; SUBCUTANEOUS at 10:25

## 2019-01-01 RX ADMIN — OXYCODONE HYDROCHLORIDE AND ACETAMINOPHEN 2 TABLET: 5; 325 TABLET ORAL at 06:49

## 2019-01-01 RX ADMIN — VITAMIN D, TAB 1000IU (100/BT) 1000 UNITS: 25 TAB at 08:43

## 2019-01-01 RX ADMIN — Medication 10 ML: at 08:41

## 2019-01-01 RX ADMIN — ACETAMINOPHEN 650 MG: 325 TABLET ORAL at 08:45

## 2019-01-01 RX ADMIN — VITAMIN D, TAB 1000IU (100/BT) 1000 UNITS: 25 TAB at 10:04

## 2019-01-01 RX ADMIN — METOPROLOL TARTRATE 75 MG: 50 TABLET ORAL at 10:04

## 2019-01-01 RX ADMIN — VITAMIN D, TAB 1000IU (100/BT) 1000 UNITS: 25 TAB at 08:37

## 2019-01-01 RX ADMIN — ASPIRIN 81 MG: 81 TABLET, COATED ORAL at 08:40

## 2019-01-01 RX ADMIN — Medication 10 ML: at 20:09

## 2019-01-01 RX ADMIN — Medication 1 TABLET: at 08:37

## 2019-01-01 RX ADMIN — CEFAZOLIN 1 G: 1 INJECTION, POWDER, FOR SOLUTION INTRAMUSCULAR; INTRAVENOUS at 23:53

## 2019-01-01 RX ADMIN — PHENYLEPHRINE HYDROCHLORIDE 100 MCG: 10 INJECTION INTRAVENOUS at 09:28

## 2019-01-01 RX ADMIN — ONDANSETRON 4 MG: 2 INJECTION INTRAMUSCULAR; INTRAVENOUS at 08:14

## 2019-01-01 RX ADMIN — FENTANYL CITRATE 25 MCG: 50 INJECTION INTRAMUSCULAR; INTRAVENOUS at 08:14

## 2019-01-01 RX ADMIN — SENNOSIDES,DOCUSATE SODIUM 1 TABLET: 8.6; 5 TABLET, FILM COATED ORAL at 08:37

## 2019-01-01 RX ADMIN — Medication 10 ML: at 10:05

## 2019-01-01 RX ADMIN — SODIUM CHLORIDE, POTASSIUM CHLORIDE, SODIUM LACTATE AND CALCIUM CHLORIDE: 600; 310; 30; 20 INJECTION, SOLUTION INTRAVENOUS at 08:08

## 2019-01-01 RX ADMIN — SENNOSIDES,DOCUSATE SODIUM 1 TABLET: 8.6; 5 TABLET, FILM COATED ORAL at 21:00

## 2019-01-01 RX ADMIN — Medication 1 TABLET: at 12:29

## 2019-01-01 RX ADMIN — OXYCODONE HYDROCHLORIDE AND ACETAMINOPHEN 1 TABLET: 5; 325 TABLET ORAL at 16:16

## 2019-01-01 RX ADMIN — PRAVASTATIN SODIUM 40 MG: 40 TABLET ORAL at 20:09

## 2019-01-01 RX ADMIN — ASPIRIN 81 MG: 81 TABLET, COATED ORAL at 08:45

## 2019-01-01 RX ADMIN — LATANOPROST 1 DROP: 50 SOLUTION OPHTHALMIC at 21:01

## 2019-01-01 RX ADMIN — AMIODARONE HYDROCHLORIDE 200 MG: 200 TABLET ORAL at 08:37

## 2019-01-01 RX ADMIN — Medication 10 ML: at 21:01

## 2019-01-01 RX ADMIN — FAMOTIDINE 20 MG: 20 TABLET ORAL at 12:32

## 2019-01-01 RX ADMIN — ASPIRIN 81 MG: 81 TABLET, COATED ORAL at 12:28

## 2019-01-01 RX ADMIN — AMIODARONE HYDROCHLORIDE 200 MG: 200 TABLET ORAL at 08:40

## 2019-01-01 RX ADMIN — ENOXAPARIN SODIUM 40 MG: 40 INJECTION SUBCUTANEOUS at 08:41

## 2019-01-01 RX ADMIN — VERAPAMIL HYDROCHLORIDE 60 MG: 120 TABLET, FILM COATED ORAL at 21:00

## 2019-01-01 RX ADMIN — OXYCODONE HYDROCHLORIDE AND ACETAMINOPHEN 1 TABLET: 5; 325 TABLET ORAL at 21:36

## 2019-01-01 RX ADMIN — FAMOTIDINE 20 MG: 20 TABLET ORAL at 08:43

## 2019-01-01 ASSESSMENT — PAIN DESCRIPTION - PAIN TYPE
TYPE: SURGICAL PAIN
TYPE: SURGICAL PAIN
TYPE: ACUTE PAIN
TYPE: ACUTE PAIN
TYPE: SURGICAL PAIN
TYPE: SURGICAL PAIN

## 2019-01-01 ASSESSMENT — PULMONARY FUNCTION TESTS
PIF_VALUE: 22
PIF_VALUE: 1
PIF_VALUE: 22
PIF_VALUE: 23
PIF_VALUE: 22
PIF_VALUE: 2
PIF_VALUE: 22
PIF_VALUE: 24
PIF_VALUE: 2
PIF_VALUE: 23
PIF_VALUE: 24
PIF_VALUE: 22
PIF_VALUE: 22
PIF_VALUE: 2
PIF_VALUE: 22
PIF_VALUE: 22
PIF_VALUE: 23
PIF_VALUE: 23
PIF_VALUE: 22
PIF_VALUE: 0
PIF_VALUE: 22
PIF_VALUE: 23
PIF_VALUE: 22
PIF_VALUE: 23
PIF_VALUE: 19
PIF_VALUE: 21
PIF_VALUE: 23
PIF_VALUE: 15
PIF_VALUE: 23
PIF_VALUE: 11
PIF_VALUE: 23
PIF_VALUE: 2
PIF_VALUE: 23
PIF_VALUE: 23
PIF_VALUE: 21
PIF_VALUE: 2
PIF_VALUE: 23
PIF_VALUE: 25
PIF_VALUE: 1
PIF_VALUE: 22
PIF_VALUE: 22
PIF_VALUE: 23
PIF_VALUE: 24
PIF_VALUE: 1
PIF_VALUE: 2
PIF_VALUE: 24
PIF_VALUE: 1
PIF_VALUE: 22
PIF_VALUE: 2
PIF_VALUE: 4
PIF_VALUE: 22
PIF_VALUE: 22
PIF_VALUE: 2
PIF_VALUE: 24
PIF_VALUE: 23
PIF_VALUE: 23
PIF_VALUE: 22
PIF_VALUE: 2
PIF_VALUE: 22
PIF_VALUE: 2
PIF_VALUE: 23
PIF_VALUE: 0
PIF_VALUE: 23
PIF_VALUE: 21
PIF_VALUE: 22
PIF_VALUE: 19
PIF_VALUE: 13
PIF_VALUE: 2
PIF_VALUE: 18
PIF_VALUE: 1
PIF_VALUE: 20
PIF_VALUE: 22
PIF_VALUE: 23
PIF_VALUE: 2
PIF_VALUE: 23
PIF_VALUE: 22
PIF_VALUE: 23
PIF_VALUE: 26
PIF_VALUE: 23
PIF_VALUE: 21
PIF_VALUE: 22
PIF_VALUE: 19
PIF_VALUE: 22
PIF_VALUE: 23
PIF_VALUE: 23
PIF_VALUE: 22
PIF_VALUE: 23
PIF_VALUE: 21
PIF_VALUE: 22
PIF_VALUE: 23
PIF_VALUE: 22
PIF_VALUE: 22
PIF_VALUE: 0
PIF_VALUE: 22
PIF_VALUE: 24
PIF_VALUE: 21
PIF_VALUE: 24
PIF_VALUE: 3
PIF_VALUE: 20
PIF_VALUE: 22
PIF_VALUE: 22
PIF_VALUE: 6
PIF_VALUE: 21
PIF_VALUE: 29
PIF_VALUE: 23
PIF_VALUE: 1
PIF_VALUE: 2
PIF_VALUE: 22
PIF_VALUE: 0
PIF_VALUE: 22
PIF_VALUE: 21
PIF_VALUE: 22

## 2019-01-01 ASSESSMENT — PAIN SCALES - GENERAL
PAINLEVEL_OUTOF10: 6
PAINLEVEL_OUTOF10: 8
PAINLEVEL_OUTOF10: 4
PAINLEVEL_OUTOF10: 6
PAINLEVEL_OUTOF10: 4
PAINLEVEL_OUTOF10: 8
PAINLEVEL_OUTOF10: 6
PAINLEVEL_OUTOF10: 6
PAINLEVEL_OUTOF10: 5
PAINLEVEL_OUTOF10: 10
PAINLEVEL_OUTOF10: 4
PAINLEVEL_OUTOF10: 4
PAINLEVEL_OUTOF10: 7
PAINLEVEL_OUTOF10: 9
PAINLEVEL_OUTOF10: 8
PAINLEVEL_OUTOF10: 8
PAINLEVEL_OUTOF10: 5
PAINLEVEL_OUTOF10: 6
PAINLEVEL_OUTOF10: 8
PAINLEVEL_OUTOF10: 0
PAINLEVEL_OUTOF10: 6
PAINLEVEL_OUTOF10: 8
PAINLEVEL_OUTOF10: 6
PAINLEVEL_OUTOF10: 5
PAINLEVEL_OUTOF10: 8
PAINLEVEL_OUTOF10: 6
PAINLEVEL_OUTOF10: 7
PAINLEVEL_OUTOF10: 0
PAINLEVEL_OUTOF10: 6
PAINLEVEL_OUTOF10: 7
PAINLEVEL_OUTOF10: 10
PAINLEVEL_OUTOF10: 3

## 2019-01-01 ASSESSMENT — PAIN DESCRIPTION - LOCATION
LOCATION: HIP;LEG
LOCATION: HIP
LOCATION: HIP;LEG
LOCATION: HIP

## 2019-01-01 ASSESSMENT — PAIN DESCRIPTION - ORIENTATION
ORIENTATION: RIGHT

## 2019-01-01 ASSESSMENT — PAIN DESCRIPTION - PROGRESSION: CLINICAL_PROGRESSION: NOT CHANGED

## 2019-01-01 ASSESSMENT — PAIN SCALES - WONG BAKER: WONGBAKER_NUMERICALRESPONSE: 4

## 2019-01-01 ASSESSMENT — PAIN DESCRIPTION - FREQUENCY: FREQUENCY: CONTINUOUS

## 2019-01-01 ASSESSMENT — PAIN DESCRIPTION - ONSET: ONSET: ON-GOING

## 2019-01-01 ASSESSMENT — PAIN DESCRIPTION - DESCRIPTORS: DESCRIPTORS: DISCOMFORT

## 2019-01-01 ASSESSMENT — PAIN - FUNCTIONAL ASSESSMENT: PAIN_FUNCTIONAL_ASSESSMENT: PREVENTS OR INTERFERES SOME ACTIVE ACTIVITIES AND ADLS

## 2019-02-13 PROBLEM — R74.8 ABNORMAL LIVER ENZYMES: Status: ACTIVE | Noted: 2019-01-01

## 2019-04-30 NOTE — LETTER
[address] [facility] [phone]  [address] [facility]    Pacemaker/Defibrillator Clinic          04/30/19        Racquel Chandler  [phone]        Dear Racquel Chandler    This letter is to inform you that we received the transmission from your monitor at home that checks your pacemaker and/or defibrillator, or implanted heart monitor. The next date your monitor will automatically transmit will be 7/30/19. Your device and monitor are wireless and most transmit cellularly, but please periodically check your monitor is still plugged in to the electrical outlet. If you still use the telephone land line to send please ensure the connection to the phone marshall is secure. This will help to ensure successful automatic transmissions in the future. Also, the monitor needs to be close to you while sleeping at night. Please be aware that the remote device transmission sites are periodically monitored only during regular business hours during which simultaneous in-office device clinics are being run. If your transmission requires attention, we will contact you as soon as possible. Thank you.             [address]

## 2019-05-01 NOTE — PROGRESS NOTES
Carelink transmission shows normal sensing and pacing function. See interrogation for more details. 3 NSVT lasting 3-8 beats. she takes Amio and Lopressor. Thoracic impedance trend stable. Follow up in 3 months via carelink.

## 2019-06-01 PROBLEM — S72.001A CLOSED FRACTURE OF NECK OF RIGHT FEMUR (HCC): Status: ACTIVE | Noted: 2019-01-01

## 2019-06-01 NOTE — ED NOTES
Call [phone] Ortho Surg.  Lorri from [facility] placed consult @ 2130  Dr. Enio Miller returned call @ [address]  06/01/19 1415

## 2019-06-01 NOTE — ED NOTES
[address] @ 4441  NP Alejandra Block called back @ [phone] and was transferred to [address] [place] [address]  06/01/19 4781

## 2019-06-01 NOTE — CONSULTS
Inpatient Consultation    Sergio Nelson (11/19/1932)  6/1/2019 Date of consult    Reason for Consult:  Right hip fracture  Requesting Physician: NEIDA Santana    CHIEF COMPLAINT:  As above    History Obtained From:  patient    HISTORY OF PRESENT ILLNESS:                The patient is a 80 y.o. female who presents with above chief complaint. Patient fell in her garage when coming back home from a day out with family. Brought to ED with inability to ambulate and right hip pain. Also has pain in right hand but has full motion. Denies head injury or LOC. Ambulated with a cane and walker on occasion. Lives with daughter. No family at bedside today.      Past Medical History:        Diagnosis Date    Anxiety     Arthritis     Eye problem     History of blood transfusion     Hyperlipidemia     Hypertension     Syncope 09/04/2016       Past Surgical History:        Procedure Laterality Date    APPENDECTOMY      CARDIAC CATHETERIZATION  09/06/2016    Non Obs CAD    HAND SURGERY Right     HEMORRHOID SURGERY      UPPER GASTROINTESTINAL ENDOSCOPY  09/09/2016       Current Medications:   Current Facility-Administered Medications: amiodarone (CORDARONE) tablet 200 mg, 200 mg, Oral, Daily  aspirin EC tablet 81 mg, 81 mg, Oral, Daily  lisinopril (PRINIVIL;ZESTRIL) tablet 2.5 mg, 2.5 mg, Oral, Daily  metoprolol tartrate (LOPRESSOR) tablet 75 mg, 75 mg, Oral, BID  pravastatin (PRAVACHOL) tablet 40 mg, 40 mg, Oral, Daily  sertraline (ZOLOFT) tablet 150 mg, 150 mg, Oral, Daily  verapamil (CALAN) tablet 60 mg, 60 mg, Oral, BID  vitamin D (CHOLECALCIFEROL) tablet 1,000 Units, 1,000 Units, Oral, Daily  therapeutic multivitamin-minerals 1 tablet, 1 tablet, Oral, Daily  hydrochlorothiazide (HYDRODIURIL) tablet 25 mg, 25 mg, Oral, Every Other Day  sodium chloride flush 0.9 % injection 10 mL, 10 mL, Intravenous, 2 times per day  sodium chloride flush 0.9 % injection 10 mL, 10 mL, Intravenous, PRN  acetaminophen (TYLENOL) tablet 650 mg, 650 mg, Oral, Q4H PRN  magnesium hydroxide (MILK OF MAGNESIA) 400 MG/5ML suspension 30 mL, 30 mL, Oral, Daily PRN  ondansetron (ZOFRAN) injection 4 mg, 4 mg, Intravenous, Q6H PRN  enoxaparin (LOVENOX) injection 40 mg, 40 mg, Subcutaneous, Daily  oxyCODONE-acetaminophen (PERCOCET) 5-325 MG per tablet 1 tablet, 1 tablet, Oral, Q4H PRN **OR** oxyCODONE-acetaminophen (PERCOCET) 5-325 MG per tablet 2 tablet, 2 tablet, Oral, Q4H PRN  morphine (PF) injection 2 mg, 2 mg, Intravenous, Q2H PRN **OR** morphine injection 4 mg, 4 mg, Intravenous, Q2H PRN  ceFAZolin (ANCEF) 2 g in sterile water 20 mL IV syringe, 2 g, Intravenous, On Call to OR    Allergies:  Patient has no known allergies. Social History:   TOBACCO:   reports that she quit smoking about 7 years ago. She has never used smokeless tobacco.  ETOH:   reports that she does not drink alcohol. DRUGS:   reports that she does not use drugs. Family History:   History reviewed. No pertinent family history. REVIEW OF SYSTEMS:    CONSTITUTIONAL:  negative  RESPIRATORY:  negative  CARDIOVASCULAR:  negative  MUSCULOSKELETAL:  positive for  pain, decreased range of motion and bone pain    PHYSICAL EXAM:      General: alert, appears stated age and cooperative   Skin: Skin No Erythema, No Edema and No Drainage   Extremity: Distal NVI   DVT Exam: No evidence of DVT seen on physical exam.  Negative Jeremy's sign. No significant calf/ankle edema.      RLE: + dorsal pedis and posterior tibial pulse palpable, +sensation intact to light touch L4-S1, thigh and calf compartments soft and compressible, motor function intact ehl, df, pf.   Good cap refill <2 sec  Pain with log roll, leg short and externally rotated    DATA:        CBC with Differential:    Lab Results   Component Value Date    WBC 12.5 06/01/2019    RBC 4.41 06/01/2019    HGB 14.1 06/01/2019    HCT 42.4 06/01/2019     06/01/2019    MCV 96.4 06/01/2019    MCH 32.0 06/01/2019    MCHC 33.2 06/01/2019    RDW 14.4 06/01/2019    LYMPHOPCT 16.7 09/09/2016    MONOPCT 8.3 09/09/2016    BASOPCT 1.0 09/09/2016    MONOSABS 0.6 09/09/2016    LYMPHSABS 1.2 09/09/2016    EOSABS 0.1 09/09/2016    BASOSABS 0.1 09/09/2016     CMP:    Lab Results   Component Value Date     06/01/2019    K 3.8 06/01/2019     06/01/2019    CO2 28 06/01/2019    BUN 23 06/01/2019    CREATININE 1.1 06/01/2019    GFRAA 57 06/01/2019    AGRATIO 1.4 06/01/2019    LABGLOM 47 06/01/2019    GLUCOSE 137 06/01/2019    PROT 6.9 06/01/2019    CALCIUM 9.6 06/01/2019    BILITOT 0.4 06/01/2019    ALKPHOS 116 06/01/2019    AST 58 06/01/2019    ALT 63 06/01/2019     BMP:    Lab Results   Component Value Date     06/01/2019    K 3.8 06/01/2019     06/01/2019    CO2 28 06/01/2019    BUN 23 06/01/2019    CREATININE 1.1 06/01/2019    CALCIUM 9.6 06/01/2019    GFRAA 57 06/01/2019    LABGLOM 47 06/01/2019    GLUCOSE 137 06/01/2019         Radiology:     @  XR CHEST PORTABLE   Final Result   No acute cardiopulmonary findings. XR FEMUR RIGHT (MIN 2 VIEWS)   Final Result   Right femoral neck fracture. XR PELVIS (1-2 VIEWS)   Final Result   Displaced right femoral neck fracture. Osteopenia. IMPRESSION/RECOMMENDATIONS:    Assessment: right displaced femoral neck fracture    Plan:  1) xray findings were reviewed with patient as well as treatment options. Given her functional status, unstable and displaced nature of fracture I recommend hemiarthroplasty for her femoral neck fracture. After discussion today with the patient she elected to proceed with surgical intervention. The surgical procedure was discussed in detail. The risks and possible complications of the surgery in general, as well as those directly related to this procedure were reviewed today.  General risks include but are not limited to persistent pain, infection, excessive blood loss, neurovascular injury, chronic swelling or edema, and the potential need for additional treatment or surgical intervention in the future. The patient understands that, again, the risks and possible complications are not limited to those specifically stated above. Due to the patients advanced age and osteopenia they are at increased risk for hardware failure, non union and death. The patient accepted the above risks, possible complications and elects to proceed. All questions were answered appropriately, and they understand that they can contact me at any time to further discuss any questions or concerns. 2) will obtain medical and cardiac clearance, due to emergencies & lack of OR availability surgery will be 6/2, NPO after midnight  3) SW for D/C planning, patient hopes to go home with daughters help after surgery  4) will continue to follow post op      Thank you for the opportunity to consult on this patient.      Jeanne Sauceda

## 2019-06-01 NOTE — H&P
Hospital Medicine History & Physical      PCP: Anh Fairbanks MD    Date of Admission: 5/31/2019    Date of Service: Pt seen/examined on 6/1/19 and Admitted to Inpatient with expected LOS greater than two midnights due to medical therapy. Chief Complaint:  Fall/hip pain      History Of Present Illness:      80 y.o. female who presented to [facility] with right hip pain. She returned from an outing and was walking up her driveway when she tripped on small ledge, falling backwards and landing on her right hip, resulting in pain. Family managed to bring her in and workup noted hip fx. She also happens to have chronic balance issues. She denies any recent cp/sob/palpitations/pnd/orthopnea. She does see Dr. Geeta Ledezma. Mahad Byrd of [facility]. Past Medical History:          Diagnosis Date    Anxiety     Arthritis     Eye problem     History of blood transfusion     Hyperlipidemia     Hypertension     Syncope 09/04/2016       Past Surgical History:          Procedure Laterality Date    APPENDECTOMY      CARDIAC CATHETERIZATION  09/06/2016    Non Obs CAD    HAND SURGERY Right     HEMORRHOID SURGERY      UPPER GASTROINTESTINAL ENDOSCOPY  09/09/2016       Medications Prior to Admission:      Prior to Admission medications    Medication Sig Start Date End Date Taking?  Authorizing Provider   Multiple Vitamins-Minerals (THERAPEUTIC MULTIVITAMIN-MINERALS) tablet Take 1 tablet by mouth daily   Yes Historical Provider, MD   hydrochlorothiazide (HYDRODIURIL) 25 MG tablet 25 mg M, W, F  Patient taking differently: Indications: Taking only Mon, Wed, Fri 25 mg M, W, F 2/19/19  Yes Kym Toussaint MD   vitamin D (CHOLECALCIFEROL) 1000 UNIT TABS tablet Take 1,000 Units by mouth daily   Yes Historical Provider, MD   amiodarone (CORDARONE) 200 MG tablet Take 1 tablet by mouth daily 2/13/19  Yes MESERET Luz - CNP   pravastatin (PRAVACHOL) 40 MG tablet Take 1 tablet by mouth daily 2/11/19  Yes Kristian Salinas MD   lisinopril (PRINIVIL;ZESTRIL) 2.5 MG tablet TAKE 1 TABLET BY MOUTH EVERY DAY 2/6/19  Yes MESERET Briseno CNP   metoprolol tartrate (LOPRESSOR) 50 MG tablet TAKE 1 AND 1/2 TABLETS BY MOUTH TWICE DAILY 2/6/19  Yes MESERET Briseno CNP   ASPIRIN LOW DOSE 81 MG EC tablet TAKE 1 TABLET BY MOUTH EVERY DAY 8/6/18  Yes Lucia Gomez MD   latanoprost (XALATAN) 0.005 % ophthalmic solution INSTILL 1 DROP IN BOTH EYES HS 5/14/17  Yes Historical Provider, MD   verapamil (CALAN) 120 MG tablet Take 0.5 tablets by mouth 2 times daily 9/9/16  Yes Jean Rodriguez MD   sertraline (ZOLOFT) 100 MG tablet Take 150 mg by mouth daily   Yes Historical Provider, MD       Allergies:  Patient has no known allergies. Social History:      The patient currently lives at home    TOBACCO:   reports that she quit smoking about 7 years ago. She has never used smokeless tobacco.  ETOH:   reports that she does not drink alcohol. Family History:       Reviewed in detail and negative for DM, CAD, Cancer, CVA. Positive as follows:    History reviewed. No pertinent family history. REVIEW OF SYSTEMS:   Pertinent positives as noted in the HPI. All other systems reviewed and negative. PHYSICAL EXAM PERFORMED:    /66   Pulse 81   Temp 98.3 °F (36.8 °C) (Oral)   Resp 16   Ht 5' 2\" (1.575 m)   Wt 145 lb (65.8 kg)   SpO2 96%   BMI 26.52 kg/m²     General appearance:  No apparent distress, appears stated age and cooperative. HEENT:  Normal cephalic, atraumatic without obvious deformity. Pupils equal, round, and reactive to light. Extra ocular muscles intact. Conjunctivae/corneas clear. Neck: Supple, with full range of motion. No jugular venous distention. Trachea midline. Respiratory:  Normal respiratory effort. Clear to auscultation, bilaterally without Rales/Wheezes/Rhonchi. Cardiovascular:  Regular rate and rhythm with normal S1/S2 without murmurs, rubs or gallops.   Abdomen: Soft, non-tender, non-distended with normal bowel sounds. Musculoskeletal:  No clubbing, cyanosis or edema bilaterally. Full range of motion without deformity except RLE shortened and externally rotated  Skin: Skin color, texture, turgor normal.  No rashes or lesions. Neurologic:  Neurovascularly intact without any focal sensory/motor deficits. Cranial nerves: II-XII intact, grossly non-focal.  Psychiatric:  Alert and oriented, thought content appropriate, normal insight  Capillary Refill: Brisk,< 3 seconds   Peripheral Pulses: +2 palpable, equal bilaterally       Labs:     Recent Labs     06/01/19  0130   WBC 12.5*   HGB 14.1   HCT 42.4        Recent Labs     06/01/19  0130      K 3.8      CO2 28   BUN 23*   CREATININE 1.1   CALCIUM 9.6     Recent Labs     06/01/19  0130   AST 58*   ALT 63*   BILITOT 0.4   ALKPHOS 116     Recent Labs     06/01/19  0953   INR 1.04     No results for input(s): Andra England in the last 72 hours. Urinalysis:      Lab Results   Component Value Date    NITRU Negative 06/01/2019    WBCUA 6-10 06/01/2019    BACTERIA Rare 06/01/2019    RBCUA 0-2 06/01/2019    BLOODU TRACE-INTACT 06/01/2019    SPECGRAV 1.020 06/01/2019    GLUCOSEU Negative 06/01/2019       Radiology:       EKG:  I have reviewed the EKG with the following interpretation: atrial paced, rate of 82, rt axis, nonspecific intraventricular conduction block    XR CHEST PORTABLE   Final Result   No acute cardiopulmonary findings. XR FEMUR RIGHT (MIN 2 VIEWS)   Final Result   Right femoral neck fracture. XR PELVIS (1-2 VIEWS)   Final Result   Displaced right femoral neck fracture. Osteopenia.              ASSESSMENT:    Active Hospital Problems    Diagnosis Date Noted    Closed fracture of neck of right femur ([address] [S72.001A] 06/01/2019         PLAN:    Right hip pain-  Due to mechanical fall resulting in right femoral neck fx  -pain control  -ortho consulted    preop eval- lanie given cardiac hx, pt appears to be controlled and CV Stable  -cards consulted by ortho, apprec recs    HOCM/NSVT- with hx of ICD, nonobstructive CAD  -mgmt per cards  -on amiodarone, bb, asa    HTN- stable on bb, acei, hctz(every other day), verapamil    HLD- on statin    DVT Prophylaxis: lovenox  Diet: DIET GENERAL;  Code Status: Full Code    PT/OT Eval Status: not yet possible    Dispo - pending cards Ellen Meyer MD    Thank you Reji He MD for the opportunity to be involved in this patient's care. If you have any questions or concerns please feel free to contact me at [phone].

## 2019-06-01 NOTE — ED PROVIDER NOTES
[facility] Emergency Department    CHIEF COMPLAINT  Fall (fall approx an hour ago while walking into garage tripped per family. Denies hitting head, right palm wound and right leg/hip pain )      HISTORY OF PRESENT ILLNESS  Franny Ch is a 80 y.o. female who presents to the ED complaining of fall and right hip fracture. Patient observed lying in bed, appears nontoxic and in no acute distress at this time. Patient brought in by squad for evaluation. Patient with history of balance issues. Exudative vehicle and was walking into the house when fell and landed on right hip. As observed by family patient did not hit head. No loss of consciousness. No complaints of neck or back pain. Pain and right hip rated at 8 out of 10. Worse with movement. No numbness, tingling, weakness of extremity. Denies chest pain or shortness of breath. Uses no blood thinners. No other complaints, modifying factors or associated symptoms. Nursing notes reviewed. Past Medical History:   Diagnosis Date    Anxiety     Eye problem     Hyperlipidemia     Hypertension     Syncope 09/04/2016     Past Surgical History:   Procedure Laterality Date    APPENDECTOMY      CARDIAC CATHETERIZATION  09/06/2016    Non Obs CAD    HAND SURGERY Right     HEMORRHOID SURGERY      UPPER GASTROINTESTINAL ENDOSCOPY  09/09/2016     History reviewed. No pertinent family history. Social History     Socioeconomic History    Marital status:       Spouse name: Not on file    Number of children: Not on file    Years of education: Not on file    Highest education level: Not on file   Occupational History    Not on file   Social Needs    Financial resource strain: Not on file    Food insecurity:     Worry: Not on file     Inability: Not on file    Transportation needs:     Medical: Not on file     Non-medical: Not on file   Tobacco Use    Smoking status: Former Smoker    Smokeless tobacco: Never Used   Substance and Sexual Activity    Alcohol use: No    Drug use: No    Sexual activity: Not on file   Lifestyle    Physical activity:     Days per week: Not on file     Minutes per session: Not on file    Stress: Not on file   Relationships    Social connections:     Talks on phone: Not on file     Gets together: Not on file     Attends Sabianist service: Not on file     Active member of club or organization: Not on file     Attends meetings of clubs or organizations: Not on file     Relationship status: Not on file    Intimate partner violence:     Fear of current or ex partner: Not on file     Emotionally abused: Not on file     Physically abused: Not on file     Forced sexual activity: Not on file   Other Topics Concern    Not on file   Social History Narrative    Not on file     Current Facility-Administered Medications   Medication Dose Route Frequency Provider Last Rate Last Dose    morphine injection 4 mg  4 mg Intravenous Once Dayana Miles         Current Outpatient Medications   Medication Sig Dispense Refill    Multiple Vitamins-Minerals (THERAPEUTIC MULTIVITAMIN-MINERALS) tablet Take 1 tablet by mouth daily      hydrochlorothiazide (HYDRODIURIL) 25 MG tablet 25 mg M, W, F (Patient taking differently: Indications: Taking only Mon, Wed, Fri 25 mg M, W, F) 30 tablet 11    vitamin D (CHOLECALCIFEROL) 1000 UNIT TABS tablet Take 1,000 Units by mouth daily      amiodarone (CORDARONE) 200 MG tablet Take 1 tablet by mouth daily 90 tablet 1    pravastatin (PRAVACHOL) 40 MG tablet Take 1 tablet by mouth daily 30 tablet 5    lisinopril (PRINIVIL;ZESTRIL) 2.5 MG tablet TAKE 1 TABLET BY MOUTH EVERY DAY 90 tablet 3    metoprolol tartrate (LOPRESSOR) 50 MG tablet TAKE 1 AND 1/2 TABLETS BY MOUTH TWICE DAILY 270 tablet 3    ASPIRIN LOW DOSE 81 MG EC tablet TAKE 1 TABLET BY MOUTH EVERY DAY 90 tablet 3    latanoprost (XALATAN) 0.005 % ophthalmic solution INSTILL 1 DROP IN BOTH EYES HS  6    right femoral neck fracture. Left hip is intact and in anatomic alignment. Pubic rings and symphysis are maintained. Intact sacroiliac joints. Chronic appearing deformity of the right iliac wing. Lumbar spondylosis. Bones are diffusely demineralized. Vascular calcifications present. Displaced right femoral neck fracture. Osteopenia. Xr Femur Right (min 2 Views)    Result Date: 6/1/2019  EXAMINATION: 4 XRAY VIEWS OF THE RIGHT FEMUR 6/1/2019 12:07 am COMPARISON: None. HISTORY: ORDERING SYSTEM PROVIDED HISTORY: fall TECHNOLOGIST PROVIDED HISTORY: Reason for exam:->fall Ordering Physician Provided Reason for Exam: FALL; RT HIP PAIN Acuity: Acute Type of Exam: Initial FINDINGS: The bones appear demineralized and show degenerative changes. There is a displaced subcapital right femoral neck fracture with superior dislocation of the distal fracture fragment. Right femoral neck fracture. Xr Chest Portable    Result Date: 6/1/2019  EXAMINATION: ONE XRAY VIEW OF THE CHEST 6/1/2019 12:39 am COMPARISON: Chest radiographs 09/16/2016 HISTORY: ORDERING SYSTEM PROVIDED HISTORY: pre op TECHNOLOGIST PROVIDED HISTORY: Reason for exam:->pre op Ordering Physician Provided Reason for Exam: FALL; RT HIP FX Acuity: Acute Type of Exam: Initial FINDINGS: No pneumothorax, pleural effusion or airspace consolidation. Stable cardiomediastinal contours with borderline cardiomegaly. Intact dual lead AICD with left chest generator. No acute osseous abnormality. No acute cardiopulmonary findings. ED COURSE   I have evaluated this patient in collaboration with Dr. Shahram Davis.      Patient received morphine for pain, with good relief. Triage vitals are stable. Portable chest x-rays negative. Right hip plain films consistent with right femoral neck fracture. Harvey catheter inserted. No convincing evidence for infectious process. CBC without leukocytosis or anemia. CMP unremarkable.   EKG paced rhythm without acute abnormalities. Discussed admission with orthopedist who recommended nothing by mouth status and will attempt to perform hip replacement tomorrow. Also discussed case with hospital medicine and orders placed. A discussion was had with Mrs. Gibran Clark regarding fall and hip fracture, ED findings, recommendations for follow-up. All questions were answered. Patient will follow up with  in agreement.       MDM  Results for orders placed or performed during the hospital encounter of 05/31/19   CBC   Result Value Ref Range    WBC 12.5 (H) 4.0 - 11.0 K/uL    RBC 4.41 4.00 - 5.20 M/uL    Hemoglobin 14.1 12.0 - 16.0 g/dL    Hematocrit 42.4 36.0 - 48.0 %    MCV 96.4 80.0 - 100.0 fL    MCH 32.0 26.0 - 34.0 pg    MCHC 33.2 31.0 - 36.0 g/dL    RDW 14.4 12.4 - 15.4 %    Platelets 855 584 - 935 K/uL    MPV 9.2 5.0 - 10.5 fL   Comprehensive metabolic panel   Result Value Ref Range    Sodium 139 136 - 145 mmol/L    Potassium 3.8 3.5 - 5.1 mmol/L    Chloride 100 99 - 110 mmol/L    CO2 28 21 - 32 mmol/L    Anion Gap 11 3 - 16    Glucose 137 (H) 70 - 99 mg/dL    BUN 23 (H) 7 - 20 mg/dL    CREATININE 1.1 0.6 - 1.2 mg/dL    GFR Non- 47 (A) >60    GFR  57 (A) >60    Calcium 9.6 8.3 - 10.6 mg/dL    Total Protein 6.9 6.4 - 8.2 g/dL    Alb 4.0 3.4 - 5.0 g/dL    Albumin/Globulin Ratio 1.4 1.1 - 2.2    Total Bilirubin 0.4 0.0 - 1.0 mg/dL    Alkaline Phosphatase 116 40 - 129 U/L    ALT 63 (H) 10 - 40 U/L    AST 58 (H) 15 - 37 U/L    Globulin 2.9 g/dL   Urinalysis   Result Value Ref Range    Color, UA Yellow Straw/Yellow    Clarity, UA Clear Clear    Glucose, Ur Negative Negative mg/dL    Bilirubin Urine Negative Negative    Ketones, Urine Negative Negative mg/dL    Specific Gravity, UA 1.020 1.005 - 1.030    Blood, Urine TRACE-INTACT (A) Negative    pH, UA 6.0 5.0 - 8.0    Protein, UA Negative Negative mg/dL    Urobilinogen, Urine 0.2 <2.0 E.U./dL    Nitrite, Urine Negative Negative    Leukocyte Esterase, Urine

## 2019-06-01 NOTE — ED NOTES
Pt spo2 on room air 88% after receiving pain medication. 2L via NC placed for comfort. 92% at this time on 2L.      Collins Elkins RN  06/01/19 2223

## 2019-06-01 NOTE — OP NOTE
-    Betsy Strauss (11/19/1932)    Surgery Date 6/1/2019    Femoral neck Fracture      Preoperative Diagnosis-   Displaced Femoral neck Fracture right hip; right ring finger for distal phalanx fracture     Postoperative Diagnosis- Displaced Femoral neck Fracture right hip; right ring finger for distal phalanx fracture    Procedure-  1.  right hip hemiarhtroplasty , 2. Application of splint to right ring finger for distal phalanx fracture             Surgeon- Nirav Devries DO    Primary Assistant(s)-  Enid Denver, SA    Anesthesia- General    EBL- 200 ml    Condition: Stable to the PACU     Implants: Depuy Corail  13mm femoral stem, 44mm bipolar shell, 28 + 1.5mm femoral head      Surgical Indications  The patient presented to the emergency room and was diagnosed with a displaced femoral neck fracture. The patient was admitted to the hospital and received medical clearance. The patients family chose to proceed with hip hemiarthroplasty. Risks, benefits, expected outcomes and potential complications were discussed. At no time were any guarantees implied or stated. The patient electively signed the consent form.     Patient Positioning and Surgical Prep  The patient was seen in the holding area and the appropriate extremity marked with an indelible pen. Preoperative IV antibiotics were then infused and the patient was taken to the operating room, delivered to general anesthesia, and transferred to a well padded table. The patient was positioned on the OR table and placed in the lateral decubitus position supported with a vacuum pack. The lower extremity was prepped from the flank to leg with Duraprep and then draped in the normal sterile fashion.      Exposure  A posterior approach was performed. A slightly curved, apex posterior incision was made centered over the posterior aspect of the greater trochanter. The incision was carried down through the subcutaneous tissue to the fascia.  The fascia was incised in line Mac

## 2019-06-01 NOTE — ED PROVIDER NOTES
The care of this patient was managed entirely by the advanced practicing provider previously noted on this chart. I did not evaluate the patient, nor did I have a doctor/patient relationship the patient. I was available for consultation regarding this patient. I did review labs, imaging, & any other diagnostic tests, if ordered, on the patient. If I am named in the documentation, please below for any comments on the patient encounter.     ED Triage Vitals [05/31/19 2347]   BP Temp Temp Source Pulse Resp SpO2 Height Weight   (!) 157/83 97.3 °F (36.3 °C) Oral 87 20 91 % 5' 2\" (1.575 m) 145 lb (65.8 kg)        Results for orders placed or performed during the hospital encounter of 05/31/19   CBC   Result Value Ref Range    WBC 12.5 (H) 4.0 - 11.0 K/uL    RBC 4.41 4.00 - 5.20 M/uL    Hemoglobin 14.1 12.0 - 16.0 g/dL    Hematocrit 42.4 36.0 - 48.0 %    MCV 96.4 80.0 - 100.0 fL    MCH 32.0 26.0 - 34.0 pg    MCHC 33.2 31.0 - 36.0 g/dL    RDW 14.4 12.4 - 15.4 %    Platelets 854 310 - 745 K/uL    MPV 9.2 5.0 - 10.5 fL   Comprehensive metabolic panel   Result Value Ref Range    Sodium 139 136 - 145 mmol/L    Potassium 3.8 3.5 - 5.1 mmol/L    Chloride 100 99 - 110 mmol/L    CO2 28 21 - 32 mmol/L    Anion Gap 11 3 - 16    Glucose 137 (H) 70 - 99 mg/dL    BUN 23 (H) 7 - 20 mg/dL    CREATININE 1.1 0.6 - 1.2 mg/dL    GFR Non- 47 (A) >60    GFR  57 (A) >60    Calcium 9.6 8.3 - 10.6 mg/dL    Total Protein 6.9 6.4 - 8.2 g/dL    Alb 4.0 3.4 - 5.0 g/dL    Albumin/Globulin Ratio 1.4 1.1 - 2.2    Total Bilirubin 0.4 0.0 - 1.0 mg/dL    Alkaline Phosphatase 116 40 - 129 U/L    ALT 63 (H) 10 - 40 U/L    AST 58 (H) 15 - 37 U/L    Globulin 2.9 g/dL   EKG 12 Lead   Result Value Ref Range    Ventricular Rate 82 BPM    Atrial Rate 82 BPM    P-R Interval 234 ms    QRS Duration 156 ms    Q-T Interval 434 ms    QTc Calculation (Bazett) 507 ms    P Axis 85 degrees    R Axis 97 degrees    T Axis -46 degrees Diagnosis       Electronic atrial pacemakerRightward axisNon-specific intra-ventricular conduction blockInferior infarct (cited on or before 02-SEP-2016)Abnormal ECGWhen compared with ECG of 03-SEP-2016 07:36,Significant changes have occurred       XR CHEST PORTABLE   Final Result   No acute cardiopulmonary findings. XR FEMUR RIGHT (MIN 2 VIEWS)   Final Result   Right femoral neck fracture. XR PELVIS (1-2 VIEWS)   Final Result   Displaced right femoral neck fracture. Osteopenia. CLINICAL IMPRESSION  1. Closed fracture of neck of right femur, initial encounter ([facility])        Blood pressure (!) 157/83, pulse 87, temperature 97.3 °F (36.3 °C), temperature source Oral, resp. rate 20, height 5' 2\" (1.575 m), weight 145 lb (65.8 kg), SpO2 92 %. Prince Lynch was admitted in stable condition. This chart was generated in part by using Dragon Dictation system and may contain errors related to that system including errors in grammar, punctuation, and spelling, as well as words and phrases that may be inappropriate. If there are any questions or concerns please feel free to contact the dictating provider for clarification.      Shayy Bettencourt DO  ATTENDING, EMERGENCY MEDICINE        Christian Mae DO  06/01/19 0207

## 2019-06-01 NOTE — PROGRESS NOTES
Per phone call with Dr. Maria A Cox: Based on pt's ECHO from 2/2019, in current status he is comfortable letting pt's hospitalist make the decision to clear pt for Ortho sx 6/2. Assuming no active cardiac problems at this time. Hospitalist may re-consult if any concerns.

## 2019-06-02 NOTE — PROGRESS NOTES
Bedside report given to c5 rn, tele verified with cmu.    4 Eyes Skin Assessment     The patient is being assess for   Post-Op Surgical    I agree that 2 RN's have performed a thorough Head to Toe Skin Assessment on the patient. ALL assessment sites listed below have been assessed. Areas assessed by both nurses:   [x]   Head, Face, and Ears   [x]   Shoulders, Back, and Chest, Abdomen  [x]   Arms, Elbows, and Hands   [x]   Coccyx, Sacrum, and Ischium  [x]   Legs, Feet, and Heels        No areas of new skin breakdown    **SHARE this note so that the co-signing nurse is able to place an eSignature**    Co-signer eSignature: {Esignature:[id number]}    Does the Patient have Skin Breakdown?   No          Mehdi Prevention initiated:  No   Wound Care Orders initiated:  No      [facility] nurse consulted for Pressure Injury (Stage 3,4, Unstageable, DTI, NWPT, Complex wounds)and New or Established Ostomies:  No      Primary Nurse eSignature: Electronically signed by Marquez Ornelas RN on 6/2/19 at 1:43 PM

## 2019-06-02 NOTE — PROGRESS NOTES
Physical Therapy      Chart reviewed; Dr. Char Dick note stating 6/1/2019 as date of surgery and orders in system to begin activity POD #1. RN contacted, RN reports, \"Pt just arrived to floor from OR, she had surgery this morning. \" Will perform initial PT eval on 6/3 per protocol and activity orders.      Blanche Nicolas, PT, DPT #[id number]

## 2019-06-02 NOTE — ANESTHESIA POSTPROCEDURE EVALUATION
Department of Anesthesiology  Postprocedure Note    Patient: Lashawn Fuentes  MRN: [id number]  YOB: 1932  Date of evaluation: 6/2/2019  Time:  10:10 AM     Procedure Summary     Date:  06/02/19 Room / Location:  [facility] OR 04 / [facility] [place] OR    Anesthesia Start:  0808 Anesthesia Stop:  1010    Procedure:  RIGHT HIP HEMIARTHROPLASTY (Right Hip) Diagnosis:  (RIGHT FEMROL NECK FRACTURE)    Surgeon:  Esther Duran DO Responsible Provider:  Liz Day MD    Anesthesia Type:  general ASA Status:  3          Anesthesia Type: general    Sola Phase I:      Sola Phase II:      Last vitals: Reviewed and per EMR flowsheets.        Anesthesia Post Evaluation    Patient location during evaluation: PACU  Patient participation: complete - patient participated  Level of consciousness: awake and alert  Pain score: 0  Airway patency: patent  Nausea & Vomiting: no nausea and no vomiting  Complications: no  Cardiovascular status: blood pressure returned to baseline  Respiratory status: acceptable  Hydration status: stable

## 2019-06-02 NOTE — PROGRESS NOTES
Hospitalist Progress Note      PCP: Gifty Caba MD    Date of Admission: 5/31/2019      Chief Complaint:  Fall/hip pain      Hospital Course:      Subjective:  Seen in postop, doing well, pain controlled       Medications:  Reviewed    Infusion Medications    sodium chloride       Scheduled Medications    amiodarone  200 mg Oral Daily    aspirin  81 mg Oral Daily    lisinopril  2.5 mg Oral Daily    metoprolol tartrate  75 mg Oral BID    pravastatin  40 mg Oral Daily    sertraline  150 mg Oral Daily    verapamil  60 mg Oral BID    vitamin D  1,000 Units Oral Daily    therapeutic multivitamin-minerals  1 tablet Oral Daily    hydrochlorothiazide  25 mg Oral Every Other Day    sodium chloride flush  10 mL Intravenous 2 times per day    enoxaparin  40 mg Subcutaneous Daily    latanoprost  1 drop Both Eyes Nightly     PRN Meds: HYDROmorphone, HYDROmorphone, morphine, morphine, oxyCODONE-acetaminophen **OR** oxyCODONE-acetaminophen, diphenhydrAMINE, ondansetron, promethazine, labetalol, hydrALAZINE, meperidine, ortho joint cocktail custom mixture, sodium chloride, sodium chloride flush, acetaminophen, magnesium hydroxide, ondansetron, oxyCODONE-acetaminophen **OR** oxyCODONE-acetaminophen, morphine **OR** morphine      Intake/Output Summary (Last 24 hours) at 6/2/2019 1111  Last data filed at 6/2/2019 1009  Gross per 24 hour   Intake 1000 ml   Output 890 ml   Net 110 ml       Physical Exam Performed:    /75   Pulse 81   Temp 97.4 °F (36.3 °C) (Temporal)   Resp 15   Ht 5' 2\" (1.575 m)   Wt 145 lb (65.8 kg)   SpO2 94%   BMI 26.52 kg/m²   General appearance:  No apparent distress, appears stated age and cooperative. HEENT:  Normal cephalic, atraumatic without obvious deformity. Pupils equal, round, and reactive to light. Extra ocular muscles intact. Conjunctivae/corneas clear. Neck: Supple, with full range of motion. No jugular venous distention. Trachea midline.   Respiratory: Normal respiratory effort. Clear to auscultation, bilaterally without Rales/Wheezes/Rhonchi. Cardiovascular:  Regular rate and rhythm with normal S1/S2 without murmurs, rubs or gallops. Abdomen: Soft, non-tender, non-distended with normal bowel sounds. Musculoskeletal:  No clubbing, cyanosis or edema bilaterally. Full range of motion without deformity except RLE now with dressing(previously was shortened and externally rotated)  Skin: Skin color, texture, turgor normal.  No rashes or lesions. Neurologic:  Neurovascularly intact without any focal sensory/motor deficits. Cranial nerves: II-XII intact, grossly non-focal.  Psychiatric:  Alert and oriented, thought content appropriate, normal insight  Capillary Refill: Brisk,< 3 seconds   Peripheral Pulses: +2 palpable, equal bilaterally       Labs:   Recent Labs     06/01/19  0130   WBC 12.5*   HGB 14.1   HCT 42.4        Recent Labs     06/01/19  0130      K 3.8      CO2 28   BUN 23*   CREATININE 1.1   CALCIUM 9.6     Recent Labs     06/01/19  0130   AST 58*   ALT 63*   BILITOT 0.4   ALKPHOS 116     Recent Labs     06/01/19  0953   INR 1.04     No results for input(s): [place] in the last 72 hours. Urinalysis:      Lab Results   Component Value Date    NITRU Negative 06/01/2019    WBCUA 6-10 06/01/2019    BACTERIA Rare 06/01/2019    RBCUA 0-2 06/01/2019    BLOODU TRACE-INTACT 06/01/2019    SPECGRAV 1.020 06/01/2019    GLUCOSEU Negative 06/01/2019       Radiology:  XR HIP RIGHT (1 VIEW)   Final Result   Interval right hip hemiarthroplasty. XR HAND RIGHT (2 VIEWS)   Final Result   Nondisplaced articular avulsion fracture dorsal aspect base of the distal   phalanx right ring finger. Evidence of soft tissue contusion right middle and ring fingers. XR CHEST PORTABLE   Final Result   No acute cardiopulmonary findings. XR FEMUR RIGHT (MIN 2 VIEWS)   Final Result   Right femoral neck fracture.          XR PELVIS (1-2 VIEWS)   Final Result   Displaced right femoral neck fracture. Osteopenia.                  Assessment/Plan:    Active Hospital Problems    Diagnosis    Closed fracture of neck of right femur ([address] [S72.001A]         Right hip pain-  Due to mechanical fall resulting in right femoral neck fx  -pain control  -ortho consulted, s/p surgery 6/2     preop eval- lanie given cardiac hx, pt appears to be controlled and CV Stable for surgery  -cards consulted by ortho, apprec recs     HOCM/NSVT- with hx of ICD, nonobstructive CAD  -mgmt per cards  -on amiodarone, bb, asa     HTN- stable on bb, acei, hctz(every other day), verapamil     HLD- on statin        DVT Prophylaxis: lovenox  Diet: Diet NPO Effective Now  Code Status: Full Code    PT/OT Eval Status: per ortho    Dispo - pending recovery, possibly Daysi Franz MD

## 2019-06-02 NOTE — ANESTHESIA PRE PROCEDURE
Department of Anesthesiology  Preprocedure Note       Name:  Gibran Osorio   Age:  80 y.o.  :  1932                                          MRN:  [id number]         Date:  2019      Surgeon: Enid Vazquez):  Ramakrishna Ibrahim DO    Procedure: RIGHT HIP HEMIARTHROPLASTY (Right Hip)    Medications prior to admission:   Prior to Admission medications    Medication Sig Start Date End Date Taking?  Authorizing Provider   Multiple Vitamins-Minerals (THERAPEUTIC MULTIVITAMIN-MINERALS) tablet Take 1 tablet by mouth daily   Yes Historical Provider, MD   hydrochlorothiazide (HYDRODIURIL) 25 MG tablet 25 mg M, ,   Patient taking differently: Indications: Taking only Mon, Wed, Fri 25 mg M, , F 19  Yes Anthony De La Paz MD   vitamin D (CHOLECALCIFEROL) 1000 UNIT TABS tablet Take 1,000 Units by mouth daily   Yes Historical Provider, MD   amiodarone (CORDARONE) 200 MG tablet Take 1 tablet by mouth daily 19  Yes MESERET Mcfadden CNP   pravastatin (PRAVACHOL) 40 MG tablet Take 1 tablet by mouth daily 19  Yes Neyda Tan MD   lisinopril (PRINIVIL;ZESTRIL) 2.5 MG tablet TAKE 1 TABLET BY MOUTH EVERY DAY 19  Yes MESERET Mcfadden CNP   metoprolol tartrate (LOPRESSOR) 50 MG tablet TAKE 1 AND 1/2 TABLETS BY MOUTH TWICE DAILY 19  Yes MESERET Mcfadden CNP   ASPIRIN LOW DOSE 81 MG EC tablet TAKE 1 TABLET BY MOUTH EVERY DAY 18  Yes Anthony De La Paz MD   latanoprost (XALATAN) 0.005 % ophthalmic solution INSTILL 1 DROP IN BOTH EYES HS 17  Yes Historical Provider, MD   verapamil (CALAN) 120 MG tablet Take 0.5 tablets by mouth 2 times daily 16  Yes Luis Fernando Rodarte MD   sertraline (ZOLOFT) 100 MG tablet Take 150 mg by mouth daily   Yes Historical Provider, MD       Current medications:    Current Facility-Administered Medications   Medication Dose Route Frequency Provider Last Rate Last Dose    amiodarone (CORDARONE) tablet 200 mg  200 mg Oral Daily Freddie Santillan APRN - CNP   200 mg at 06/01/19 1004    aspirin EC tablet 81 mg  81 mg Oral Daily Floridalma Cater, APRN - CNP        lisinopril (PRINIVIL;ZESTRIL) tablet 2.5 mg  2.5 mg Oral Daily Floridalma Cater, APRN - CNP        metoprolol tartrate (LOPRESSOR) tablet 75 mg  75 mg Oral BID Floridalma Cater, APRN - CNP   75 mg at 06/01/19 2023    pravastatin (PRAVACHOL) tablet 40 mg  40 mg Oral Daily Floridalma Cater, APRN - CNP   40 mg at 06/01/19 1004    sertraline (ZOLOFT) tablet 150 mg  150 mg Oral Daily Floridalma Cater, APRN - CNP        verapamil (CALAN) tablet 60 mg  60 mg Oral BID Floridalma Cater, APRN - CNP   60 mg at 06/01/19 2108    vitamin D (CHOLECALCIFEROL) tablet 1,000 Units  1,000 Units Oral Daily Floridalma Cater, APRN - CNP   1,000 Units at 06/01/19 1004    therapeutic multivitamin-minerals 1 tablet  1 tablet Oral Daily Floridalma Cater, APRN - CNP   1 tablet at 06/01/19 1004    hydrochlorothiazide (HYDRODIURIL) tablet 25 mg  25 mg Oral Every Other Day Floridalma Cater, APRN - CNP        sodium chloride flush 0.9 % injection 10 mL  10 mL Intravenous 2 times per day Floridalma Cater, APRN - CNP   10 mL at 06/01/19 2023    sodium chloride flush 0.9 % injection 10 mL  10 mL Intravenous PRN Floridalma Cater, APRN - CNP        acetaminophen (TYLENOL) tablet 650 mg  650 mg Oral Q4H PRN Floridalma Cater, APRN - CNP        magnesium hydroxide (MILK OF MAGNESIA) 400 MG/5ML suspension 30 mL  30 mL Oral Daily PRN Floridalma Cater, APRN - CNP        ondansetron [facility]) injection 4 mg  4 mg Intravenous Q6H PRN Floridalma Cater, APRN - CNP        enoxaparin (LOVENOX) injection 40 mg  40 mg Subcutaneous Daily Floridalma Cater, APRN - CNP        oxyCODONE-acetaminophen (PERCOCET) 5-325 MG per tablet 1 tablet  1 tablet Oral Q4H PRN Floridalma Cater, APRN - CNP   1 tablet at 06/01/19 2108    Or    oxyCODONE-acetaminophen (PERCOCET) 5-325 MG per tablet 2 tablet  2 tablet Oral Q4H PRN MESERET Hernandez CNP   2 tablet at 19 0454    morphine (PF) injection 2 mg  2 mg Intravenous Q2H PRN MESERET Hernandez CNP        Or    morphine injection 4 mg  4 mg Intravenous Q2H PRN MESERET Hernandez CNP        latanoprost (XALATAN) 0.005 % ophthalmic solution 1 drop  1 drop Both Eyes Nightly MESERET Hernandez CNP           Allergies:  No Known Allergies    Problem List:    Patient Active Problem List   Diagnosis Code    Syncope and collapse R55    Ventricular tachycardia (HCC) I47.2    Abnormal EKG R94.31    Laceration of head S01. 91XA    Essential (primary) hypertension I10    HLD (hyperlipidemia) E78.5    Depression F32.9    HOCM [facility]hypertrophic obstructive cardiomyopathy) ([facility]) I42.1    Nonrheumatic aortic valve stenosis I35.0    Cardiac defibrillator in place Z95.810    Hypertensive heart disease with heart failure (HCC) I11.0    Abnormal liver enzymes R74.8    Closed fracture of neck of right femur ([facility] S72.001A       Past Medical History:        Diagnosis Date    Anxiety     Arthritis     Eye problem     History of blood transfusion     HOCM (hypertrophic obstructive cardiomyopathy) ([facility]     Dr. Enriqueta Mitchell Hyperlipidemia     Hypertension     Syncope 2016    V-tach ([facility]     hx of ICD       Past Surgical History:        Procedure Laterality Date    APPENDECTOMY      CARDIAC CATHETERIZATION  2016    Non Obs CAD    HAND SURGERY Right     HEMORRHOID SURGERY      UPPER GASTROINTESTINAL ENDOSCOPY  2016       Social History:    Social History     Tobacco Use    Smoking status: Former Smoker     Last attempt to quit: 2012     Years since quittin.0    Smokeless tobacco: Never Used   Substance Use Topics    Alcohol use:  No                                Counseling given: Not Answered      Vital Signs (Current):   Vitals:    19 2307 19 0346 06/02/19 0615 06/02/19 0725   BP: 116/77 (!) 141/75 (!) 140/77    Pulse: 84 84 86    Resp: 16 16 16 16   Temp: 98.8 °F (37.1 °C) 98.2 °F (36.8 °C) 99 °F (37.2 °C) 98.5 °F (36.9 °C)   TempSrc: Oral Oral Oral Oral   SpO2: 90% 91% 92%    Weight:       Height:                                                  BP Readings from Last 3 Encounters:   06/02/19 (!) 140/77   03/13/19 98/68   02/19/19 128/78       NPO Status: Time of last liquid consumption: 0454(perc given)                        Time of last solid consumption: 1900                        Date of last liquid consumption: 06/02/19                        Date of last solid food consumption: 06/01/19    BMI:   Wt Readings from Last 3 Encounters:   05/31/19 145 lb (65.8 kg)   03/13/19 143 lb (64.9 kg)   02/19/19 145 lb 6.4 oz (66 kg)     Body mass index is 26.52 kg/m². CBC:   Lab Results   Component Value Date    WBC 12.5 06/01/2019    RBC 4.41 06/01/2019    HGB 14.1 06/01/2019    HCT 42.4 06/01/2019    MCV 96.4 06/01/2019    RDW 14.4 06/01/2019     06/01/2019       CMP:   Lab Results   Component Value Date     06/01/2019    K 3.8 06/01/2019     06/01/2019    CO2 28 06/01/2019    BUN 23 06/01/2019    CREATININE 1.1 06/01/2019    GFRAA 57 06/01/2019    AGRATIO 1.4 06/01/2019    LABGLOM 47 06/01/2019    GLUCOSE 137 06/01/2019    PROT 6.9 06/01/2019    CALCIUM 9.6 06/01/2019    BILITOT 0.4 06/01/2019    ALKPHOS 116 06/01/2019    AST 58 06/01/2019    ALT 63 06/01/2019       POC Tests: No results for input(s): POCGLU, POCNA, POCK, POCCL, POCBUN, POCHEMO, POCHCT in the last 72 hours.     Coags:   Lab Results   Component Value Date    PROTIME 11.8 06/01/2019    INR 1.04 06/01/2019    APTT 36.5 09/02/2016       HCG (If Applicable): No results found for: PREGTESTUR, PREGSERUM, HCG, HCGQUANT     ABGs: No results found for: PHART, PO2ART, JLV0KEX, VHQ1ATP, BEART, W7ERBYWB     Type & Screen (If Applicable):  No results found for: LABABO, [address]    Anesthesia Evaluation  Patient summary reviewed and Nursing notes reviewed  Airway: Mallampati: II     Neck ROM: limited  Mouth opening: < 3 FB and > = 3 FB Dental:    (+) upper dentures  Comment: Poor dentition    Pulmonary:Negative Pulmonary ROS breath sounds clear to auscultation                             Cardiovascular:    (+) hypertension:, valvular problems/murmurs:, hyperlipidemia        Rhythm: regular  Rate: normal                 ROS comment: HOCM; no evidence of this on last echo     Neuro/Psych:   (+) psychiatric history:            GI/Hepatic/Renal: Neg GI/Hepatic/Renal ROS            Endo/Other: Negative Endo/Other ROS                    Abdominal:           Vascular: negative vascular ROS. Anesthesia Plan      general     ASA 3       Induction: intravenous. MIPS: Postoperative opioids intended and Prophylactic antiemetics administered. Anesthetic plan and risks discussed with patient. Plan discussed with CRNA.                   Tamia Gray MD   6/2/2019

## 2019-06-02 NOTE — PLAN OF CARE
Patient skin assessed and charted in flowsheet. Patient has a mepilex on coccyx, heels elevated for comfort and patient encouraged to turn and move about in bed. Pt verbalized understanding. Did let staff help reposition once but states she doesn't want to be on sides. Explained to patient the reason for moving and preventing skin breakdown. Will continue to assess and monitor. Pt bed locked and in lowest position, 2/4 side rails up, call light within reach, fall band and non skid footwear on pt. Bed alarm is on bed and engaged. Pt instructed not to get up without calling the nurse and pt verbalizes understanding. Pt resting comfortably at this time with RR 16. Will continue to assess and monitor.

## 2019-06-02 NOTE — PROGRESS NOTES
Asked nocturnal hospitalist to review pt chart and clarify whether cleared for surgery or not. hospitalist verbalized understanding but no note in chart. Will pass along to day shift drs for clarification.

## 2019-06-03 NOTE — CARE COORDINATION
CASE MANAGEMENT INITIAL ASSESSMENT      Reviewed chart and met with patient today, re: dc options and plan of care   Explained Case Management role/services. Family present: no but patient alert and oriented; discussed by phone with daughter, Zeyad Combs  Primary contact information:     Admit date/status: 5-31-19  Diagnosis: Closed femoral neck fx    Insurance: [employer]  Precert required for SNF - Y; 3 night stay required -  N    Living arrangements, Adls, care needs, prior to admission: lives with daughter, son in law; family supportive and provides full care    Transportation: family    Durable Medical Equipment at home: walker, cane, sc   Services in the home and/or outpatient, prior to admission: states she is active with a [employer] agency but unsure which agency; needs SNF at [place]. PT/OT recs: SNF    Hospital Exemption Notification (HEN): will need    Barriers to discharge: none; patient agreeable to SNF    Plan/comments: Discussed SNF options with daughter; explained need for precert; went over facilities in network; daughter to discuss with family/spouse/patient and contact dc planner with choice.      ECOC on chart for MD signature

## 2019-06-03 NOTE — PROGRESS NOTES
position/activity restrictions: splint R ring finger for distal fx, pt is s/p R hip hemiarthroplasty with posterior approach    Subjective   General  Chart Reviewed: Yes  Patient assessed for rehabilitation services?: Yes  Family / Caregiver Present: No  Referring Practitioner: BRIGIDA Watts  Diagnosis: R femur fx s/p R hemiarthroplasty   General Comment  Comments: RN approved therapy   Pain Assessment  Pain Assessment: 0-10  Pain Level: 4  Pain Location: Hip;Leg  Pain Orientation: Right  Oxygen Therapy  SpO2: (!) 9 %  O2 Device: Nasal cannula  O2 Flow Rate (L/min): 2 L/min  Social/Functional History  Social/Functional History  Lives With: Daughter  Type of Home: House  Home Layout: One level, Able to Live on Main level with bedroom/bathroom  Home Access: Level entry  Bathroom Shower/Tub: Walk-in shower  Bathroom Equipment: Shower chair, Grab bars in shower  Home Equipment: Cane, Rolling walker, Reacher  ADL Assistance: Needs assistance(Daughter assists with socks. Otherwise pt is independent.)  Homemaking Responsibilities: No(Daughter performs housework. )  Ambulation Assistance: Independent(Alternates between cane and RW)  Transfer Assistance: Independent  Active : No  Patient's  Info: Daughter drives pt. Objective   Vision: Impaired  Vision Exceptions: Wears glasses for reading  Hearing: Within functional limits    Orientation  Overall Orientation Status: Within Functional Limits     Balance  Sitting Balance: Stand by assistance  Standing Balance: Dependent/Total(mod x2 Tao Nares )  Standing Balance  Activity: bed to chair transfer with mod x2 with Tao Nares   ADL  Feeding: Setup; Increased time to complete  Grooming: Stand by assistance(wash hands while seated)  LE Dressing: Dependent/Total(socks )  Tone RUE  RUE Tone: Normotonic  Tone LUE  LUE Tone: Normotonic  Coordination  Movements Are Fluid And Coordinated: Yes     Bed mobility  Supine to Sit: Dependent/Total(max x2)  Transfers  Sit to stand:  Moderate assistance;2 Person assistance  Stand to sit: Moderate assistance;2 Person assistance     Cognition  Overall Cognitive Status: Exceptions  Following Commands: Follows one step commands consistently  Memory: Decreased recall of precautions        Sensation  Overall Sensation Status: WFL      LUE AROM (degrees)  LUE AROM : WFL  RUE AROM (degrees)  RUE AROM : WFL  RUE General AROM: R 4th finger splint  LUE Strength  Gross LUE Strength: WFL  RUE Strength  Gross RUE Strength: WFL(shoulder to elbow tested)      Plan   Plan  Times per week: 4-6x    AM-PAC Score   AM-[facility] Inpatient Daily Activity Raw Score: 12  AM-PAC Inpatient ADL T-Scale Score : 30.6  ADL Inpatient CMS 0-100% Score: 66.57  ADL Inpatient CMS G-Code Modifier : CL  Goals  Short term goals  Time Frame for Short term goals: for 1 week  Short term goal 1: Perform functional transfers with mod x1 with LRAD by 6/10  Short term goal 2: Perform LE dressing with mod A  Short term goal 3: Perform toileting with mod A  Patient Goals   Patient goals :  \"Be able to walk\"     Therapy Time   Individual Concurrent Group Co-treatment   Time In 7583         Time Out 0908         Minutes 21         Timed Code Treatment Minutes: 11 Minutes(10 min eval )     Jade Mendoza OTR/L

## 2019-06-03 NOTE — PROGRESS NOTES
Department of Orthopedic Surgery  Physician Assistant   Progress Note    Subjective:     Post-Operative Day: 1 Status Post right Hip Hemiarthroplasty  Systemic or Specific Complaints:Pain Control    Objective:     Patient Vitals for the past 24 hrs:   BP Temp Temp src Pulse Resp SpO2   06/03/19 0740 112/67 99 °F (37.2 °C) Oral 86 16 (!) 9 %   06/03/19 0644 110/62 98.1 °F (36.7 °C) Oral 86 16 93 %   06/03/19 0245 121/73 98.1 °F (36.7 °C) Oral 83 16 92 %   06/02/19 2356 (!) 94/57 101.2 °F (38.4 °C) Oral 85 16 97 %   06/02/19 2015 95/64 99.4 °F (37.4 °C) Oral 80 16 96 %   06/02/19 1742 (!) 94/56 -- -- -- -- --   06/02/19 1632 (!) 73/51 98.1 °F (36.7 °C) Oral 83 -- 92 %   06/02/19 1340 (!) 129/95 97.9 °F (36.6 °C) Oral 82 16 95 %   06/02/19 1241 110/71 98.2 °F (36.8 °C) Oral 83 16 91 %       General: alert, appears stated age and cooperative   Wound: Wound clean and dry no evidence of infection. , Wound Intact and Positive for Edema   Motion: Painful range of Motion   DVT Exam: No evidence of DVT seen on physical exam.       NVI in lower extremity. Thigh swollen but soft. Moving foot and ankle. SPlint intact on hand. senastion intact. Data Review  CBC:   Lab Results   Component Value Date    WBC 12.5 06/01/2019    RBC 4.41 06/01/2019    HGB 14.1 06/01/2019    HCT 42.4 06/01/2019     06/01/2019       Assessment:     Status Post right Hip Hemiarthroplasty. Doing well postoperatively. Plan:      1: Continues current post-op course : Will need SNF at discharge.   2:  Continue Deep venous thrombosis prophylaxis  3:  Continue physical therapy  4:  Continue Pain Control

## 2019-06-03 NOTE — PROGRESS NOTES
Removed pt drain due to it disconnecting while transferring to bathroom. No complications noted. Pt tolerated well.

## 2019-06-03 NOTE — PROGRESS NOTES
Physical Therapy    Facility/Department: [place] [address] - MED SURG/ORTHO  Initial Assessment    NAME: Johanny Cagle  : 1932  MRN: [id number]    Date of Service: 6/3/2019    Discharge Recommendations:  Subacute/Skilled Nursing Facility        Assessment   Body structures, Functions, Activity limitations: Decreased functional mobility ; Decreased strength  Assessment: 79 yo female adm with R hip fx, s/p posterior approach hemiarthroplasty, allowed WBAT, hip abd pillow in place. Pt splinted for R 4th finger fx. PLOF lives with family on 1st floor of 2 level home with level entry, amb with RW or cane depending on distance. Today pt requires assist of 2 for bed mob and sit to stand, STEDY to chair. Recommend skilled PT to address above deficits, SNF at discharge  Treatment Diagnosis: weakness, decreased mobility post R hip fx  Specific instructions for Next Treatment: ther ex, mobility, progress as tolerated  Prognosis: Good  Decision Making: Low Complexity  Patient Education: role of PT, safety, WBAT  Barriers to Learning: pt voices understanding  REQUIRES PT FOLLOW UP: Yes  Activity Tolerance  Activity Tolerance: Patient Tolerated treatment well  Activity Tolerance: /56 up in chair at end of session, pt without complaint       Patient Diagnosis(es): The encounter diagnosis was Closed fracture of neck of right femur, initial encounter ([facility] [address] has a past medical history of Anxiety, Arthritis, Eye problem, History of blood transfusion, HOCM (hypertrophic obstructive cardiomyopathy) ([facility] [address] Hyperlipidemia, Hypertension, Syncope, and V-tach ([facility] [address] has a past surgical history that includes Hand surgery (Right); Appendectomy; Hemorrhoid surgery; Cardiac catheterization (2016); and Upper gastrointestinal endoscopy (2016).     Restrictions  Restrictions/Precautions  Restrictions/Precautions: Weight Bearing, General Precautions, Fall Risk  Required Braces or Orthoses?: Yes  Lower Extremity Weight Bearing Restrictions  Right Lower Extremity Weight Bearing: Weight Bearing As Tolerated  Required Braces or Orthoses  Right Upper Extremity Brace/Splint: R 4th finger splint due to avulsion fx  Position Activity Restriction  Other position/activity restrictions: splint R ring finger for distal fx, pt is s/p R hip hemiarthroplasty with posterior approach  Vision/Hearing  Vision: Impaired  Vision Exceptions: Wears glasses for reading  Hearing: Within functional limits     Subjective  Chart Reviewed: Yes  Patient assessed for rehabilitation services?: Yes  Referring Practitioner: Bree Beltrán  Referral Date : 06/03/19  Diagnosis: R femur fx , s/p hemiarthroplasty posterior approach, R ring finger distal fx- splinted  Follows Commands: Within Functional Limits  General Comment  Comments: RN cleared pt for therapy, pt resting in bed on approach  Subjective  Subjective: Agrees to therapy  Pain Screening  Patient Currently in Pain: Yes  Pain Assessment  Pain Assessment: 0-10  Pain Level: 4  Pain Type: Surgical pain  Pain Location: Hip  Pain Orientation: Right  Pain Descriptors: Discomfort  Pain Frequency: Continuous(4/10 at rest, 6/10 with activity)  Pain Onset: On-going  Clinical Progression: Not changed  Functional Pain Assessment: Prevents or interferes some active activities and ADLs  Non-Pharmaceutical Pain Intervention(s): Repositioned; Emotional support  Response to Pain Intervention: Patient Satisfied  Pre Treatment Pain Screening  Intervention List: Patient able to continue with treatment    Orientation  Overall Orientation Status: Within Normal Limits  Social/Functional History  Social/Functional History  Lives With: Daughter  Type of Home: House  Home Layout: One level, Able to Live on Main level with bedroom/bathroom  Home Access: Level entry  Bathroom Shower/Tub: Walk-in shower  Bathroom Equipment: Shower chair, Grab bars in [address]: Cane, Rolling walker, Reacher  ADL Assistance: Needs assistance(Daughter assists with socks. Otherwise pt is independent.)  Homemaking Responsibilities: No(Daughter performs housework. )  Ambulation Assistance: Independent(Alternates between cane and RW)  Transfer Assistance: Independent  Active : No  Patient's  Info: Daughter drives pt. Objective     RLE General PROM: Limited at hip due to post op precautions and restrictions with posterior approach Hemiarthroplasty  LLE AROM : WFL  Strength RLE: Indep AP, poor QS, Fair GS  Strength LLE: WFL        Bed mobility  Supine to Sit: Dependent/Total(max assist of 2)  Transfers  Sit to Stand: Dependent/Total(min assist of 2 in STEDY from elevated bed)  Stand to sit: Minimal Assistance  Bed to Chair: Dependent/Total(Stedy bed to chair)  Ambulation  Ambulation?: No(unable due to pain and weakness)        Exercises  Quad Sets: 10  x B poor  Heelslides: 10 x RLE assisted, 5 x LLE indep  Gluteal Sets: 10 x B  Knee Short Arc Quad: 10 x RLE assisted  Ankle Pumps: 10 x B     Plan   Times per week: 7 days wk  Times per day: Daily  Specific instructions for Next Treatment: ther ex, mobility, progress as tolerated  Current Treatment Recommendations: Strengthening, Gait Training, Functional Mobility Training, Endurance Training, Safety Education & Training, Transfer Training  Safety Devices  Type of devices:  All fall risk precautions in place, Left in chair, Call light within reach, Gait belt, Patient at risk for falls, Nurse notified    AM-PAC Score     AM-PAC Inpatient Mobility without Stair Climbing Raw Score : 8  AM-PAC Inpatient without Stair Climbing T-Scale Score : 30.65  Mobility Inpatient CMS 0-100% Score: 80.91  Mobility Inpatient without Stair CMS G-Code Modifier : CM     Goals  Short term goals  Time Frame for Short term goals: 1 week (6/10) unless otherwise specified  Short term goal 1: pt to move supine to and from sit with CG  Short term goal 2: pt to move sit to and from stand with min assist of 1  Short term goal 3: pt to

## 2019-06-03 NOTE — PROGRESS NOTES
Pt has not urinated since ibarra cath was removed at 0300. Bladder scanned at 1330 and only 245 mL noted on the scan. Pt denies the need to urinate at this time. Will take pt to bathroom after she is finished eating ( within 1 hour) to attempt to void. If no void at that time will straight cath per order.

## 2019-06-04 NOTE — PROGRESS NOTES
Department of Orthopedic Surgery  Physician Assistant   Progress Note    Subjective:     Post-Operative Day: 2 Status Post right Hip Hemiarthroplasty  Systemic or Specific Complaints:Pain Control about the same    Objective:     Patient Vitals for the past 24 hrs:   BP Temp Temp src Pulse Resp SpO2   06/04/19 0730 103/68 98.1 °F (36.7 °C) Oral 86 16 94 %   06/04/19 0410 113/68 99 °F (37.2 °C) Oral 83 18 98 %   06/03/19 2210 113/65 99.1 °F (37.3 °C) Oral 90 18 95 %   06/03/19 2015 104/66 98.7 °F (37.1 °C) Oral 82 16 96 %   06/03/19 1745 (!) 112/56 97.9 °F (36.6 °C) Oral 82 16 94 %       General: alert, appears stated age and cooperative   Wound: Wound clean and dry no evidence of infection. , Wound Intact and Positive for Edema   Motion: Painful range of Motion   DVT Exam: No evidence of DVT seen on physical exam.       NVI in lower extremity. Thigh swollen but soft. Moving foot and ankle. SPlint intact on hand. senastion intact. Data Review  CBC:   Lab Results   Component Value Date    WBC 10.3 06/04/2019    RBC 2.69 06/04/2019    HGB 8.8 06/04/2019    HCT 26.0 06/04/2019     06/04/2019       Assessment:     Status Post right Hip Hemiarthroplasty. Doing well postoperatively. Plan:      1: Continues current post-op course : Will need SNF at discharge.   2:  Continue Deep venous thrombosis prophylaxis  3:  Continue physical therapy  4:  Continue Pain Control

## 2019-06-04 NOTE — PROGRESS NOTES
Occupational Therapy  Facility/Department: [facility] C5 - MED SURG/ORTHO  Daily Treatment Note  NAME: Isaiah Castrejon  : 1932  MRN: [id number]    Date of Service: 2019    Discharge Recommendations:  Subacute/Skilled Nursing Facility  OT Equipment Recommendations  Equipment Needed: No  Other: defer to next level of care    Assessment   Performance deficits / Impairments: Decreased functional mobility ; Decreased ADL status; Decreased safe awareness;Decreased balance;Decreased endurance;Decreased high-level IADLs  Assessment: Pt continues to be pleasant and agreeable to treatment, demonstrating ability to ambulate with RW this date, but require up to Max A x2 after ambulating about 20 ft d/t LLE instability and returned to sitting safety with Ax2. Continue to recommend SNF at d/c. Continue OT tx. Patient Education: safe transfers and mobility   REQUIRES OT FOLLOW UP: Yes  Activity Tolerance  Activity Tolerance: Patient limited by fatigue  Activity Tolerance: Pt Spo2 >90% on RA prior to mobility, 87% on RA following mobility, with 1L O2 replaced and cues for PLB Spo2 96%  Safety Devices  Safety Devices in place: Yes  Type of devices: Nurse notified;Gait belt;Call light within reach; Left in bed;Bed alarm in place         Patient Diagnosis(es): The encounter diagnosis was Closed fracture of neck of right femur, initial encounter ([facility] [address] has a past medical history of Anxiety, Arthritis, Eye problem, History of blood transfusion, HOCM (hypertrophic obstructive cardiomyopathy) ([facility] [address] Hyperlipidemia, Hypertension, Syncope, and V-tach ([facility] [address] has a past surgical history that includes Hand surgery (Right); Appendectomy; Hemorrhoid surgery; Cardiac catheterization (2016); Upper gastrointestinal endoscopy (2016); and HEMIARTHROPLASTY HIP (Right, 2019).     Restrictions  Restrictions/Precautions  Restrictions/Precautions: Weight Bearing, General Precautions, Fall Risk  Required Braces or Orthoses?:

## 2019-06-04 NOTE — PROGRESS NOTES
auscultation, bilaterally without Rales/Wheezes/Rhonchi. Cardiovascular: Regular rate and rhythm with normal S1/S2 without murmurs, rubs or gallops. Abdomen: Soft, non-tender, non-distended with normal bowel sounds. Musculoskeletal: No clubbing, cyanosis or edema bilaterally. Surgical site is clean  Skin: Skin color, texture, turgor normal.  No rashes or lesions. Neurologic:  Neurovascularly intact without any focal sensory/motor deficits. Cranial nerves: II-XII intact, grossly non-focal.  Psychiatric: Alert and oriented, appropriate responses  Capillary Refill: Brisk,< 3 seconds   Peripheral Pulses: +2 palpable, equal bilaterally     I examined the patient today (06/04/19), physical exam is the same as yesterday (6/3)    Labs:   Recent Labs     06/04/19  0535   WBC 10.3   HGB 8.8*   HCT 26.0*   *     Recent Labs     06/04/19  0535      K 4.0      CO2 26   BUN 35*   CREATININE 1.1   CALCIUM 8.5     No results for input(s): AST, ALT, BILIDIR, BILITOT, ALKPHOS in the last 72 hours. Recent Labs     06/01/19  0953   INR 1.04     No results for input(s): Floridalma Jubilee in the last 72 hours. Urinalysis:      Lab Results   Component Value Date    NITRU Negative 06/01/2019    WBCUA 6-10 06/01/2019    BACTERIA Rare 06/01/2019    RBCUA 0-2 06/01/2019    BLOODU TRACE-INTACT 06/01/2019    SPECGRAV 1.020 06/01/2019    GLUCOSEU Negative 06/01/2019       Radiology:  VL Extremity Venous Left   Final Result      XR HIP RIGHT (1 VIEW)   Final Result   Interval right hip hemiarthroplasty. XR HAND RIGHT (2 VIEWS)   Final Result   Nondisplaced articular avulsion fracture dorsal aspect base of the distal   phalanx right ring finger. Evidence of soft tissue contusion right middle and ring fingers. XR CHEST PORTABLE   Final Result   No acute cardiopulmonary findings. XR FEMUR RIGHT (MIN 2 VIEWS)   Final Result   Right femoral neck fracture.          XR PELVIS (1-2 VIEWS)   Final Result   Displaced right femoral neck fracture. Osteopenia. Assessment/Plan:    Active Hospital Problems    Diagnosis    Closed fracture of neck of right femur ([facility] [address] [S72.001A]     PLAN:    Femoral neck fracture  Postoperative, doing fine  No new issues  Perioperative care by orthopedic surgery  Pain is controlled. D/c to skilled nursing facility when arrangements made      Left arm swelling  Venous Dopplers are negative for vein thrombosis  Elevate extremities, supportive care  Better today    Hypertension  Controlled blood pressure noted. Continue same medications    Dyslipidemia  Continue simvastatin    Discussed with nursing  D/w patient, questions answered     DVT Prophylaxis: Lovenox  Diet: DIET GENERAL;  Code Status: Full Code    PT/OT Eval Status: In process    Dispo - Medically stable.  Awaiting family choice and facility, and insurance pre-cert    Ric [place] MD

## 2019-06-04 NOTE — DISCHARGE INSTR - COC
Continuity of Care Form    Patient Name: Az Brito   :  1932  MRN:  [id number]    Admit date:  2019  Discharge date:  19    Code Status Order: Full Code   Advance Directives:   Advance Care Flowsheet Documentation     Date/Time Healthcare Directive Type of Healthcare Directive Copy in [address] Agent's Name Healthcare Agent's Phone Number    19 1293  Yes, patient has an advance directive for healthcare treatment  Durable power of  for health care  No, copy requested from family  Adult Children  [address]  [phone] (H), [phone] (c)    19 0351  Yes, patient has an advance directive for healthcare treatment  Durable power of  for health care  No, copy requested from family  --  Alexx Russell  [phone] home; [phone] cell          Admitting Physician:  Jeremiah Dunlap MD  PCP: Reji He MD    Discharging Nurse: [facility] Unit/Room#: 9843/4599-19  Discharging Unit Phone Number: [phone]    Emergency Contact:   Extended Emergency Contact Information  Primary Emergency Contact: [facility]  Address: [address] [place] [address], [address] [place] [address] [address] Phone: [phone]  Mobile Phone: [phone]  Relation: Child  Secondary Emergency Contact: [address] Phone: [phone]  Mobile Phone: [phone]  Relation: Other    Past Surgical History:  Past Surgical History:   Procedure Laterality Date    APPENDECTOMY      CARDIAC CATHETERIZATION  2016    Non Obs CAD    HAND SURGERY Right     HEMIARTHROPLASTY HIP Right 2019    RIGHT HIP HEMIARTHROPLASTY performed by Suze Alcantar DO at [address] END[facility]COPY  2016       Immunization History: There is no immunization history on file for this patient.     Active Problems:  Patient Active Problem List   Diagnosis Code    Syncope and collapse R55    Ventricular tachycardia (HCC) I47.2    Abnormal EKG R94.31    Laceration of head S01. 91XA    Essential (primary) hypertension I10    HLD (hyperlipidemia) E78.5    Depression F32.9    HOCM (hypertrophic obstructive cardiomyopathy) (HCC) I42.1    Nonrheumatic aortic valve stenosis I35.0    Cardiac defibrillator in place Z95.810    Hypertensive heart disease with heart failure (HCC) I11.0    Abnormal liver enzymes R74.8    Closed fracture of neck of right femur ([facility]) S72.001A       Isolation/Infection:   Isolation          No Isolation            Nurse Assessment:  Last Vital Signs: /68   Pulse 86   Temp 98.1 °F (36.7 °C) (Oral)   Resp 16   Ht 5' 2\" (1.575 m)   Wt 145 lb (65.8 kg)   SpO2 94%   BMI 26.52 kg/m²     Last documented pain score (0-10 scale): Pain Level: 5  Last Weight:   Wt Readings from Last 1 Encounters:   05/31/19 145 lb (65.8 kg)     Mental Status:  oriented and alert    IV Access:  - None    Nursing Mobility/ADLs:  Walking   Assisted  Transfer  Assisted  Bathing  Assisted  Dressing  Assisted  Toileting  Assisted  Feeding  Independent  Med Admin  Assisted  Med Delivery   whole    Wound Care Documentation and Therapy:  Incision 09/02/16 Head Posterior (Active)   Number of days: 1004        Elimination:  Continence:   · Bowel: Yes  · Bladder: Yes  Urinary Catheter: None   Colostomy/Ileostomy/Ileal Conduit: No       Date of Last BM: 6/5/19    Intake/Output Summary (Last 24 hours) at 6/4/2019 0912  Last data filed at 6/3/2019 2210  Gross per 24 hour   Intake --   Output 500 ml   Net -500 ml     I/O last 3 completed shifts:  In: -   Out: 500 [Urine:500]    Safety Concerns:     History of Falls (last 30 days) and At Risk for Falls    Impairments/Disabilities:      Vision    Nutrition Therapy:  Current Nutrition Therapy:   - Oral Diet:  General    Routes of Feeding: Oral  Liquids:  Thin Liquids  Daily Fluid Restriction: no  Last Modified Barium Swallow with Video (Video Swallowing Test): not done    Treatments at the Time of Hospital Discharge:   Respiratory Treatments: ***  Oxygen Therapy:  is on oxygen at 2 L/min per nasal cannula. Ventilator:    - No ventilator support    Rehab Therapies: Physical Therapy and Occupational Therapy  Weight Bearing Status/Restrictions: No weight bearing restirctions  Other Medical Equipment (for information only, NOT a DME order):  wheelchair, walker, bath bench and hospital bed  Other Treatments: ***    Patient's personal belongings (please select all that are sent with patient):  {[facility] DME Belongings:[id number]}    RN SIGNATURE:  Electronically signed by Annamarie Gaytan RN on 6/5/19 at 2:09 PM    CASE MANAGEMENT/SOCIAL WORK SECTION    Inpatient Status Date: 6/1/19    Readmission Risk Assessment Score:  Readmission Risk              Risk of Unplanned Readmission:        14           Discharging to Facility/ Agency   · Name: [facility]   · Address:  · Phone: [phone]  · Fax: [phone]    Dialysis Facility (if applicable)   · Name:  · Address:  · Dialysis Schedule:  · Phone:  · Fax:    / signature: Electronically signed by Humberto Rosado RN on 6/5/19 at 2:03 PM    PHYSICIAN SECTION    Prognosis: Good    Condition at Discharge: Stable    Rehab Potential (if transferring to Rehab): Good    Recommended Labs or Other Treatments After Discharge: CBC, BMP in 3 days    Physician Certification: I certify the above information and transfer of [facility]  is necessary for the continuing treatment of the diagnosis listed and that she requires [place] for less 30 days.      Update Admission H&P: No change in H&P    PHYSICIAN SIGNATURE:  Electronically signed by Demetris Lovell MD on 6/5/2019 at 1:04 PM

## 2019-06-05 NOTE — PROGRESS NOTES
Physical Therapy  Facility/Department: [facility] C5 - MED SURG/ORTHO  Daily Treatment Note  NAME: Elizabeth Pabon  : 1932  MRN: [id number]    Date of Service: 2019    Discharge Recommendations:  Subacute/Skilled Nursing Facility        Assessment   Body structures, Functions, Activity limitations: Decreased functional mobility ; Decreased strength  Assessment: Pt amb with walker today with A of 2, poor follow thru with cues for sequence. Recommend SNF for D/C. Treatment Diagnosis: weakness, decreased mobility post R hip fx  Specific instructions for Next Treatment: ther ex, mobility, progress as tolerated  Prognosis: Good  Patient Education: role of PT, safety, WBAT  REQUIRES PT FOLLOW UP: Yes  Activity Tolerance  Activity Tolerance: Patient Tolerated treatment well     Patient Diagnosis(es): The encounter diagnosis was Closed fracture of neck of right femur, initial encounter ([facility] [address] has a past medical history of Anxiety, Arthritis, Eye problem, History of blood transfusion, HOCM (hypertrophic obstructive cardiomyopathy) ([facility] [address] Hyperlipidemia, Hypertension, Syncope, and V-tach ([facility] [address] has a past surgical history that includes Hand surgery (Right); Appendectomy; Hemorrhoid surgery; Cardiac catheterization (2016); Upper gastrointestinal endoscopy (2016); and HEMIARTHROPLASTY HIP (Right, 2019).     Restrictions  Restrictions/Precautions  Restrictions/Precautions: Weight Bearing, General Precautions, Fall Risk  Required Braces or Orthoses?: Yes  Lower Extremity Weight Bearing Restrictions  Right Lower Extremity Weight Bearing: Weight Bearing As Tolerated  Required Braces or Orthoses  Right Upper Extremity Brace/Splint: R 4th finger splint due to avulsion fx  Position Activity Restriction  Other position/activity restrictions: splint R ring finger for distal fx     Subjective   General  Chart Reviewed: Yes  Family / Caregiver Present: No  Referring Practitioner: Mac  Subjective  Subjective: Agrees to therapy  General Comment  Comments: RN cleared pt for therapy, pt resting in bed on approach  Pain Screening  Patient Currently in Pain: Yes  Pain Assessment  Pain Assessment: 0-10  Pain Level: 6  Pain Type: Surgical pain  Pain Location: Hip;Leg  Pain Orientation: Right  Vital Signs  Patient Currently in Pain: Yes       Orientation  Orientation  Overall Orientation Status: Within Normal Limits     Objective   Bed mobility  Supine to Sit: Moderate assistance  Sit to Supine: Unable to assess  Transfers  Sit to Stand: Dependent/Total(min/ mod of 2 with intial LOB posteriorly)  Stand to sit: Dependent/Total(min of 2)  Ambulation  Ambulation?: Yes  Ambulation 1  Surface: level tile  Device: Standard Walker  Assistance: Dependent/Total(mod/ min of 2)  Quality of Gait: mod assist to advance and direct sw, constant cues for sequence, flexed posture w/ head down  Gait Deviations: Slow Christine;Decreased step length;Decreased step height  Distance: 15' X 2        Exercises  Quad Sets: 10  x B poor  Heelslides: 10 x RLE assisted  Gluteal Sets: 10 x B  Knee Long Arc Quad: 10 x BLE   Ankle Pumps: 15 x B        AM-PAC Score     AM-PAC Inpatient Mobility without Stair Climbing Raw Score : 10 (06/05/19 1521)  AM-PAC Inpatient without Stair Climbing T-Scale Score : 34.07 (06/05/19 1521)  Mobility Inpatient CMS 0-100% Score: 71.66 (06/05/19 1521)  Mobility Inpatient without Stair CMS G-Code Modifier : CL (06/05/19 1521)       Goals  Short term goals  Time Frame for Short term goals: 1 week (6/10) unless otherwise specified  Short term goal 1: pt to move supine to and from sit with CG  Short term goal 2: pt to move sit to and from stand with min assist of 1  Short term goal 3: pt to amb with SW 25 ft with min assist WBAT  Short term goal 4: pt to participate in 12-15 reps LE ex by 6/5  Patient Goals   Patient goals : \"to heal and walk\"    Plan    Plan  Times per week: 7 days wk  Times per day: Daily  Specific instructions for Next Treatment: ther ex, mobility, progress as tolerated  Current Treatment Recommendations: Strengthening, Gait Training, Functional Mobility Training, Endurance Training, Safety Education & Training, Transfer Training  Safety Devices  Type of devices:  All fall risk precautions in place, Call light within reach, Chair alarm in place, Gait belt, Left in chair, Nurse notified     Therapy Time   Individual Concurrent Group Co-treatment   Time In 1130         Time Out 1155         Minutes [id number] ROWENA Lucas#[id number]

## 2019-06-05 NOTE — DISCHARGE SUMMARY
Hospital Medicine Discharge Summary    Patient ID: Wu Gutierrez      Patient's PCP: Andre Cotton MD    Admit Date: 5/31/2019     Discharge Date:   06/05/19     Admitting Physician: Anderson Winkler MD     Discharge Physician: Kimani Garcia MD     Discharge Diagnoses: Active Hospital Problems    Diagnosis    Closed fracture of neck of right femur ([facility] [address] [S72.001A]    Essential (primary) hypertension [I10]    HLD (hyperlipidemia) [E78.5]       The patient was seen and examined on day of discharge and this discharge summary is in conjunction with any daily progress note from day of discharge. Hospital Course:       Patient was admitted after a fall. Right femur fracture was diagnosed. Orthopedic surgery was consulted. Had the open reduction and internal fixation surgery with no immediate complications. Blood loss was within expected amounts. Patient did not require transfusion  Vitals remained stable  On the day of discharge, patient was at her baseline. Will be discharged to skilled nursing facility. Pre-CERT has been received. Physical Exam Performed:     BP (!) 126/92   Pulse 88   Temp 97.7 °F (36.5 °C) (Axillary)   Resp 18   Ht 5' 2\" (1.575 m)   Wt 145 lb (65.8 kg)   SpO2 91%   BMI 26.52 kg/m²     General appearance: No apparent distress, appears stated age and cooperative. HEENT: Pupils equal, round, and reactive to light. Conjunctivae/corneas clear. Neck: Supple, with full range of motion. No jugular venous distention. Trachea midline. Respiratory:  Normal respiratory effort. Clear to auscultation, bilaterally without Rales/Wheezes/Rhonchi. Cardiovascular: Regular rate and rhythm with normal S1/S2 without murmurs, rubs or gallops. Abdomen: Soft, non-tender, non-distended with normal bowel sounds. Musculoskeletal: No clubbing, cyanosis or edema bilaterally.   Surgical site is clean  Skin: Skin color, texture, turgor normal.  No rashes or lesions. Neurologic:  Neurovascularly intact without any focal sensory/motor deficits. Cranial nerves: II-XII intact, grossly non-focal.  Psychiatric: Alert and oriented, appropriate responses  Capillary Refill: Brisk,< 3 seconds   Peripheral Pulses: +2 palpable, equal bilaterally       Labs: For convenience and continuity at follow-up the following most recent labs are provided:      CBC:    Lab Results   Component Value Date    WBC 10.6 06/05/2019    HGB 9.3 06/05/2019    HCT 27.7 06/05/2019     06/05/2019       Renal:    Lab Results   Component Value Date     06/04/2019    K 4.0 06/04/2019     06/04/2019    CO2 26 06/04/2019    BUN 35 06/04/2019    CREATININE 1.1 06/04/2019    CALCIUM 8.5 06/04/2019         Significant Diagnostic Studies    Radiology:   VL Extremity Venous Left   Final Result      XR HIP RIGHT (1 VIEW)   Final Result   Interval right hip hemiarthroplasty. XR HAND RIGHT (2 VIEWS)   Final Result   Nondisplaced articular avulsion fracture dorsal aspect base of the distal   phalanx right ring finger. Evidence of soft tissue contusion right middle and ring fingers. XR CHEST PORTABLE   Final Result   No acute cardiopulmonary findings. XR FEMUR RIGHT (MIN 2 VIEWS)   Final Result   Right femoral neck fracture. XR PELVIS (1-2 VIEWS)   Final Result   Displaced right femoral neck fracture. Osteopenia.                 Consults:     IP CONSULT TO ORTHOPEDIC SURGERY  IP CONSULT TO HOSPITALIST  IP CONSULT TO SOCIAL WORK    Disposition:  Skilled nursing facility, short-term     Condition at Discharge: Stable    Discharge Instructions/Follow-up:  Orthopedic surgery as directed    Code Status:  Full Code     Activity: activity as tolerated    Diet: regular diet      Discharge Medications:     Current Discharge Medication List           Details   oxyCODONE-acetaminophen (PERCOCET) 5-325 MG per tablet Take 1 tablet by mouth every 6 hours as needed for Pain for up to 3 days. Qty: 12 tablet, Refills: 0    Comments: Reduce doses taken as pain becomes manageable  Associated Diagnoses: Closed fracture of neck of right femur, initial encounter ([facility])      enoxaparin (LOVENOX) 40 MG/0.4ML injection Inject 0.4 mLs into the skin daily  Qty: 12.8 mL, Refills: 0      sennosides-docusate sodium (SENOKOT-S) 8.6-50 MG tablet Take 1 tablet by mouth 2 times daily              Details   Multiple Vitamins-Minerals (THERAPEUTIC MULTIVITAMIN-MINERALS) tablet Take 1 tablet by mouth daily      hydrochlorothiazide (HYDRODIURIL) 25 MG tablet 25 mg M, W, F  Qty: 30 tablet, Refills: 11    Associated Diagnoses: Hypertensive heart disease with heart failure ([facility])      vitamin D (CHOLECALCIFEROL) 1000 UNIT TABS tablet Take 1,000 Units by mouth daily      amiodarone (CORDARONE) 200 MG tablet Take 1 tablet by mouth daily  Qty: 90 tablet, Refills: 1      pravastatin (PRAVACHOL) 40 MG tablet Take 1 tablet by mouth daily  Qty: 30 tablet, Refills: 5    Associated Diagnoses: Elevated LFTs      lisinopril (PRINIVIL;ZESTRIL) 2.5 MG tablet TAKE 1 TABLET BY MOUTH EVERY DAY  Qty: 90 tablet, Refills: 3    Comments: **Patient requests 90 days supply**      metoprolol tartrate (LOPRESSOR) 50 MG tablet TAKE 1 AND 1/2 TABLETS BY MOUTH TWICE DAILY  Qty: 270 tablet, Refills: 3    Comments: **Patient requests 90 days supply**      ASPIRIN LOW DOSE 81 MG EC tablet TAKE 1 TABLET BY MOUTH EVERY DAY  Qty: 90 tablet, Refills: 3      latanoprost (XALATAN) 0.005 % ophthalmic solution INSTILL 1 DROP IN BOTH EYES HS  Refills: 6      verapamil (CALAN) 120 MG tablet Take 0.5 tablets by mouth 2 times daily  Qty: 30 tablet, Refills: 3      sertraline (ZOLOFT) 100 MG tablet Take 150 mg by mouth daily             Time Spent on discharge is more than 45 minutes in the examination, evaluation, counseling and review of medications and discharge plan.       Signed:    Kevon Kehr, MD   6/5/2019      Thank you Yaya Virgen MD for the opportunity to be involved in this patient's care. If you have any questions or concerns please feel free to contact me at [phone].

## 2019-06-05 NOTE — CARE COORDINATION
Met with pt and daughter at bedside to obtain SNF choice. Daughter states that she has toured facilities and would like referral to [facility]. Referral faxed and f/u call placed to [facility] to ensure receipt. [facility] is currently reviewing and will call DCP with approval/ denial. Will follow.      Federico Chinchilla RN

## 2019-06-05 NOTE — CARE COORDINATION
CASE MANAGEMENT DISCHARGE SUMMARY      Discharge to: The [address] completed: Yes   Hospital Exemption Notification (HENS) completed: Yes    New Durable Medical Equipment ordered/agency:     Transportation: [facility]    Family/car:   Medical Transport explained to [facility]. Pt/family voice no agency preference. Agency used:   time: 1630   Ambulance form completed: Yes    Notified: DCP notified at bedside    Family:    Facility/Agency: [facility] faxed   RN: Swetha   Phone number for report to facility: [phone]    Note: Discharging nurse to complete [facility], reconcile [facility], and place final copy with patient's discharge packet. RN to ensure that written prescriptions for  Level II medications are sent with patient to the facility as per protocol.     Nhi Valero RN

## 2019-06-05 NOTE — PROGRESS NOTES
[facility]

## 2019-06-05 NOTE — PROGRESS NOTES
Occupational Therapy  Facility/Department: [facility] C5 - MED SURG/ORTHO  Daily Treatment Note  NAME: Zane Joel  : 1932  MRN: [id number]    Date of Service: 2019    Discharge Recommendations:  Subacute/Skilled Nursing Facility     Assessment   Performance deficits / Impairments: Decreased functional mobility ; Decreased ADL status; Decreased safe awareness;Decreased balance;Decreased endurance;Decreased high-level IADLs;Decreased cognition  Assessment: Pt continues to function below baseline for ADLs and transfers. Pt requires skilled OT services at SNF to address self care skills, transfers, and home safety. Cont OT. Prognosis: Good  Patient Education: role of OT, transfers, ADLs,safety   REQUIRES OT FOLLOW UP: Yes  Activity Tolerance  Activity Tolerance: Patient Tolerated treatment well  Safety Devices  Type of devices: Nurse notified;Gait belt;Call light within reach; Left in chair;Chair alarm in place       Patient Diagnosis(es): The encounter diagnosis was Closed fracture of neck of right femur, initial encounter ([facility] [address] has a past medical history of Anxiety, Arthritis, Eye problem, History of blood transfusion, HOCM (hypertrophic obstructive cardiomyopathy) ([facility] [address] Hyperlipidemia, Hypertension, Syncope, and V-tach ([facility] [address] has a past surgical history that includes Hand surgery (Right); Appendectomy; Hemorrhoid surgery; Cardiac catheterization (2016); Upper gastrointestinal endoscopy (2016); and HEMIARTHROPLASTY HIP (Right, 2019).     Restrictions  Restrictions/Precautions  Restrictions/Precautions: Weight Bearing, General Precautions, Fall Risk  Required Braces or Orthoses?: Yes  Lower Extremity Weight Bearing Restrictions  Right Lower Extremity Weight Bearing: Weight Bearing As Tolerated  Required Braces or Orthoses  Right Upper Extremity Brace/Splint: R 4th finger splint due to avulsion fx  Position Activity Restriction  Other position/activity restrictions: splint R ring finger for distal fx  Subjective   General  Chart Reviewed: Yes  Patient assessed for rehabilitation services?: Yes  Family / Caregiver Present: Yes(daughter)  Referring Practitioner: BRIGIDA Watts  Diagnosis: R femur fx s/p R hemiarthroplasty with posterior approach  Subjective  Subjective: Pt agreeable to OT  General Comment  Comments: RN approved therapy   Pain Assessment  Pain Level: 6  Pain Location: Hip;Leg  Pain Orientation: Right   Orientation  Orientation  Overall Orientation Status: Within Functional Limits  Objective    ADL  Grooming: Stand by assistance(seated to wash hands)  LE Dressing: Maximum assistance(socks and brief)  Toileting: Moderate assistance(Pt performed pericare after urination; required assistance for brief )     Balance  Sitting Balance: Supervision  Standing Balance: Dependent/Total(min x2 with walker initially, improving to min Ax1)  Functional Mobility  Functional - Mobility Device: Rolling Walker  Activity: To/from bathroom  Assist Level: Dependent/Total(min x2)  Functional Mobility Comments: Max cues for sequencing steps with RW and to safely navigate walker. Instructed pt to turn completely prior to sitting (appears to fatigue as she ambulates)  Toilet Transfers  Toilet - Technique: Ambulating  Equipment Used: Grab bars  Toilet Transfer: Minimal assistance;2 Person assistance  Toilet Transfers Comments: vc's for safe hand placement  Bed mobility  Supine to Sit: Moderate assistance(HOB elevated)  Sit to Supine: Unable to assess  Transfers  Stand Pivot Transfers: Minimal assistance;2 Person assistance(RW)  Sit to stand: Minimal assistance  Stand to sit: Minimal assistance      Cognition  Following Commands:  Follows one step commands with repetition  Attention Span: Attends with cues to redirect  Memory: Decreased recall of precautions  Problem Solving: Assistance required to correct errors made;Assistance required to identify errors made  Insights: Decreased awareness of deficits  Initiation: Requires cues for some  Sequencing: Requires cues for some      Type of ROM/Therapeutic Exercise  Type of ROM/Therapeutic Exercise: AROM  Comment: seated  Exercises  Shoulder Elevation: 10x  Horizontal ABduction: 10x  Horizontal ADduction: 10x  Elbow Flexion: 10x  Elbow Extension: 10x  Other: chest presses 10x      Plan   Plan  Times per week: 4-6x  AM-PAC  AM-PAC Inpatient Daily Activity Raw Score: 14 (06/05/19 1430)  AM-PAC Inpatient ADL T-Scale Score : 33.39 (06/05/19 1430)  ADL Inpatient CMS 0-100% Score: 59.67 (06/05/19 1430)  ADL Inpatient CMS G-Code Modifier : CK (06/05/19 1430)    Goals  Short term goals  Time Frame for Short term goals: for 1 week  Short term goal 1: Perform functional transfers with mod x1 with LRAD by 6/10  Short term goal 2: Perform LE dressing with mod A  Short term goal 3: Perform toileting with mod A-goal met 6/05  Patient Goals   Patient goals :  \"Be able to walk\"     Therapy Time   Individual Concurrent Group Co-treatment   Time In 4671         Time Out 1155         Minutes 23         Timed Code Treatment Minutes: [address] OTR/L

## 2019-06-05 NOTE — PROGRESS NOTES
Department of Orthopedic Surgery  Physician Assistant   Progress Note    Subjective:     Post-Operative Day: 3 Status Post right Hip Hemiarthroplasty  Systemic or Specific Complaints:Pain Control     Objective:     Patient Vitals for the past 24 hrs:   BP Temp Temp src Pulse Resp SpO2   06/05/19 0835 124/62 97.8 °F (36.6 °C) Oral 82 18 91 %   06/05/19 0406 (!) 121/59 98.5 °F (36.9 °C) Oral 88 18 94 %   06/04/19 2239 131/77 98.3 °F (36.8 °C) Oral 80 18 95 %   06/04/19 1915 116/73 98.6 °F (37 °C) Oral 85 16 94 %   06/04/19 1541 (!) 92/58 98.7 °F (37.1 °C) Oral 86 16 94 %       General: alert, appears stated age and cooperative   Wound: Wound clean and dry no evidence of infection. , Wound Intact and Positive for Edema   Motion: Painful range of Motion   DVT Exam: No evidence of DVT seen on physical exam.       NVI in lower extremity. Thigh swollen but soft. Moving foot and ankle. SPlint intact on hand. senastion intact. Data Review  CBC:   Lab Results   Component Value Date    WBC 10.6 06/05/2019    RBC 2.85 06/05/2019    HGB 9.3 06/05/2019    HCT 27.7 06/05/2019     06/05/2019       Assessment:     Status Post right Hip Hemiarthroplasty. Doing well postoperatively. Plan:      1: Continues current post-op course : Will need SNF at discharge. Okay to d/c from ortho standpoint.   Will follow peripherally  2:  Continue Deep venous thrombosis prophylaxis  3:  Continue physical therapy  4:  Continue Pain Control

## 2019-06-05 NOTE — CARE COORDINATION
DCP called daughter Raad Steel to update on approval to admit to [facility], and pending precert status. Will update her once information becomes available.      Nhi Valero RN

## 2019-06-05 NOTE — CARE COORDINATION
Called [facility] to check status of precert; Spoke with [facility] [address] who states that cert is currently in Medical Review and should have answer in 4-6 hours. IAP asked for update before 1600, as pt is scheduled for transport to facility at 1630. BODØ at [facility] updated. Will follow. Humberto Rosado RN      Addendum 6098 Precert authorized for pt to admit [facility] today. [facility] notified pt and daughter Saba Barahona at bedside as well as primary RN Swetha.      Humberto Rosado RN

## 2019-06-05 NOTE — CARE COORDINATION
Spoke with Aparna Saxena at the [facility] who stated they can accept patient pending pre-cert. Placed call to [address] [place] [address] at [phone] and spoke with Emerita Humphrey who stated to fax the information to [phone]. SW Initiated pre-cert through [address] [place] [address] by [employer], H&P, therapy evaluations,CM assessment, current therapy and MD note and op note to [facility] Attention intake at [phone].     Evangelina Green MSW, LSW

## 2019-06-25 NOTE — TELEPHONE ENCOUNTER
Patients daughter states patient fell and broke her hip, currently is in nursing home, she is coming home sometime this week. The doctor at the rehab center stopped her HCTZ and put her on Lasix. The daughter doesn't think she will be getting the Lasix after she gets home, only her already home medications. Should she go back on the HCTZ? Nakita cell # [phone]    The HCTZ was changed to Lasix because her BUN elevated, and she had very bad edema. Patient does have an appt with PCP on Friday.

## 2019-06-25 NOTE — TELEPHONE ENCOUNTER
I did also talk to the nurse at the center, Coleen Cardenas, she states patient is taking Lasix 40mg BID, also Ktab 10meq QD. Please advise.

## 2019-06-25 NOTE — TELEPHONE ENCOUNTER
I cannot answer any of these questions because we have not been involved in any of these decisions about diuretics. She will need to rely on the doctors at the facility to make the recommendation. She can see  or [facility] [address] to sort out. This should be soon unless family doctor can help.   ROLF

## 2019-06-25 NOTE — TELEPHONE ENCOUNTER
The [address] [place] [address] facility called stating pt will be discharged from their facility this week. The house doctor started pt on Furosemide 40 BID while pt was there but pt was taking hydrochlorothiazide when she was at home prior to being at the nursing facility. [facility] is asking if the pt should be taking furosemide or hydrochlorothiazide after her discharge and what strength. Please advise and contact [facility] at [phone].

## 2019-06-25 NOTE — TELEPHONE ENCOUNTER
Nakita,pt's dtr, called asking about the clarification regarding pt's furosemide and hydrochlorothiazide. She also stated that the pt will be getting discharged this week and wants to know if pt should move her ROLF 8-27-19 appt up to sooner date. Yen BANSAL is booked until September)    Please contact [place] [phone] and The [facility] [phone] regarding the medications and Nakita about the appt info.

## 2019-06-26 NOTE — TELEPHONE ENCOUNTER
Unable to get an appt with [facility]. Pt is scheduled 07/02/19 at 8:00 to discuss diuretic and pt's overall condition.

## 2019-07-02 NOTE — LETTER
[address]   Advanced Heart Failure/Pulmonary Hypertension  Cardiac Evaluation      Toy Ortiz  YOB: 1932    Date of Visit:  7/2/19      Chief Complaint   Patient presents with    Follow-up    Edema         History of Present Illness:  Toy Ortiz is a female who presents for follow up for hypertrophic cardiomyopathy. She was admitted on 09/03/16 for unexplained syncope, HOCM and Non sustained ventricular tachycardia. I was asked to evaluate her for possible Implantable cardioverter defibrillator  Therapy. Patient presented with unexplained syncope and was witnessed to have symptomatic palpitations with Non sustained ventricular tachycardia. Patient has long history of both symptomatic and asymptomatic syncope. She was with her daughter at [employer] when she had unexplained syncope. She underwent a cardiac cath on 09/06/16 which showed no significant CAD. She underwent a dual chamber MRI-ICD on 09/07/16. On 09/06/17 she is doing well. She is here for follow up for cardiomyopathy. She states she fell in her bedroom, no syncope. She states she has a cough that occurs in the evenings. No dyspnea or chest pain or palpitations. She is tolerating her medications without difficulty. She states her energy level has been stable. She follows with DR. Margaux Zhu for her AICD and device checks. Her last device check showed pacing function. Her optival level shows her fluid status has improved. She has had no episodes of dizziness or syncope. She folllows with her PCP every 3 months. She states she had swelling in her legs when she was om vacation that has improved. Her echo on 11/29/17 showed and EF of 65-70%. Her echo from 02/15/19 showed her EF was >65%. She was admitted 05/31/19-06/05/19 due to a fall. She fractured her right femur and right ring finger.  On 05/31/19 she underwent right hip hemia throplasty and splint placed to right ring finger for distal phalanx Procedure Laterality Date    APPENDECTOMY      CARDIAC CATHETERIZATION  2016    Non Obs CAD    HAND SURGERY Right     HEMIARTHROPLASTY HIP Right 2019    RIGHT HIP HEMIARTHROPLASTY performed by Piedad Curiel DO at [address] ENDOSCOPY  2016     History reviewed. No pertinent family history. Social History     Socioeconomic History    Marital status:      Spouse name: Not on file    Number of children: Not on file    Years of education: Not on file    Highest education level: Not on file   Occupational History    Not on file   Social Needs    Financial resource strain: Not on file    Food insecurity:     Worry: Not on file     Inability: Not on file    Transportation needs:     Medical: Not on file     Non-medical: Not on file   Tobacco Use    Smoking status: Former Smoker     Last attempt to quit: 2012     Years since quittin.0    Smokeless tobacco: Never Used   Substance and Sexual Activity    Alcohol use: No    Drug use: No    Sexual activity: Not on file   Lifestyle    Physical activity:     Days per week: Not on file     Minutes per session: Not on file    Stress: Not on file   Relationships    Social connections:     Talks on phone: Not on file     Gets together: Not on file     Attends Confucianist service: Not on file     Active member of club or organization: Not on file     Attends meetings of clubs or organizations: Not on file     Relationship status: Not on file    Intimate partner violence:     Fear of current or ex partner: Not on file     Emotionally abused: Not on file     Physically abused: Not on file     Forced sexual activity: Not on file   Other Topics Concern    Not on file   Social History Narrative    Not on file       Review of Systems:   · Constitutional: there has been no unanticipated weight loss. There's been no change in energy level, sleep pattern, or activity level. · Peripheral pulses are symmetrical and full  · There is no clubbing, cyanosis of the extremities. · Trace bilateral edema  · Pedal Pulses: 2+ and equal   Neck:  · JVP less than 8 cm H2O  · No thyromegaly  Abdomen:  · No masses or tenderness  · Liver/Spleen: No Abnormalities Noted  Neurological/Psychiatric:  · Alert and oriented in all spheres  · Moves all extremities well  · Exhibits normal gait balance and coordination  · No abnormalities of mood, affect, memory, mentation, or behavior are noted      LEFT HEART CATH 09/06/16  LM: mild calcification, luminals  LAD: tortuous, long mid LAD myocardial bridge with mild   LCX: tortuous, luminals  RCA: Tortous, angulated takeoff with some catheter induced spasm at ostium     LVEDP: 22-25  LVEF: 65%  No significant MR seen     After PVC: LV systolic pressure did reach 220mmHg  LV: 183/6 (LVEDP 22-25)  Pt did not tolerate LV catheters due to ventricular ectopy which limited study, Not able to due dual pressure study     PLAN  1. No significant CAD (luminals only left main and prox LAD)  2. RCA spasm due to cathether  3. LVEF 65% with inability to perform LV pullback gradient or dual LV/Ao pressure study due to VT/hypotension  4. Will consult EP for VT. Echo: 02/15/19  There is hyperdynamic LV systolic function with an estimated ejection   fraction of >65%. No regional wall motion abnormalities are seen. Left ventricular diastolic filling pressure are indeterminate. Mild aortic regurgitation. Mild mitral and tricuspid regurgitation. Systolic pulmonary artery pressure (SPAP) is normal and estimated at 30 mmHg   (right atrial pressure 3 mmHg). Pacer / ICD wire is visualized in the right ventricle. No change from last echo on 11/29/2017. Labs were reviewed including labs from other hospital systems through [facility].   Cardiac testing was reviewed including echos, nuclear scans, cardiac

## 2019-07-02 NOTE — PATIENT INSTRUCTIONS
Plan:   1. Compression stockings   2. Stay off HCTZ. Continue lasix and potassium   3. BMP and BNP in 1 month  4.  Follow up with Nelson Murrell CNP in 6-8 weeks

## 2019-07-11 NOTE — PROGRESS NOTES
DIAGNOSIS:  Right femoral neck fracture, status post hip hemiarthroplasty    DATE OF SURGERY: 2019. HISTORY OF PRESENT ILLNESS: Ms. Toi Adan 80 y.o. female  who came in today for 4 weeks postoperative visit. The patient denies any significant pain in the right hip. She has been working on ROM and 100% WB with  PT. No numbness or tingling sensation. No fever or Chills. She is living with her daughter, her family is with her at today's appointment. PHYSICAL EXAMINATION:    Ht 5' 2.01\" (1.575 m)   Wt 144 lb 6.4 oz (65.5 kg)   BMI 26.40 kg/m²     Pain Assessment:  Pain Assessment  Location of Pain: Other (Comment)(hip)  Location Modifiers: Right  Severity of Pain: 5  Quality of Pain: Aching, Dull  Duration of Pain: Persistent  Frequency of Pain: Intermittent  Aggravating Factors: Exercise, Walking, Standing  Limiting Behavior: Yes  Relieving Factors: Ice, Exercise  Result of Injury: No  Work-Related Injury: No  Are there other pain locations you wish to document?: No    Patient is awake, alert, and in no acute distress. The incision is healed well. No signs of any erythema or drainage. She has no pain with the active or passive range of motion of the right  hip. She has intact sensation to light touch distally, and is neurovascularly intact. IMAGIN views of the right hip are obtained and reviewed today show a well-positioned right hip hemiarthroplasty no loosening or hardware failure. 3 views of the right ring finger obtained and reviewed, they show distal phalanx with volar translation      IMPRESSION:    4 weeks status post right hip hemiarthroplasty, right ring finger distal phalanx fracture. PLAN:    I have told the patient to work on ROM with  PT, 100% WB as well as strengthening exercises. The patient will come back for a follow up in 2 months or as needed. At that time, we will take Two views right hip, and AP pelvis.         Conrad Hummer        This dictation was performed

## 2019-07-30 NOTE — LETTER
[address] [facility] [phone]  [address] [facility]    Pacemaker/Defibrillator Clinic          07/30/19        Araceli Rogers  [phone]        Dear Araceli Rogers    This letter is to inform you that we received the transmission from your monitor at home that checks your pacemaker and/or defibrillator, or implanted heart monitor. The next date your monitor will automatically transmit will be 10/29/19. Your device and monitor are wireless and most transmit cellularly, but please periodically check your monitor is still plugged in to the electrical outlet. If you still use the telephone land line to send please ensure the connection to the phone marshall is secure. This will help to ensure successful automatic transmissions in the future. Also, the monitor needs to be close to you while sleeping at night. Please be aware that the remote device transmission sites are periodically monitored only during regular business hours during which simultaneous in-office device clinics are being run. If your transmission requires attention, we will contact you as soon as possible. Thank you.             [facility]

## 2019-08-19 NOTE — LETTER
The aortic valve is mildly thickened/calcified without significant aortic   stenosis.   Mild aortic regurgitation.   Mild mitral and tricuspid regurgitation.   Systolic pulmonary artery pressure (SPAP) is normal and estimated at 27 mmHg   (RA pressure 3 mmHg). LEFT HEART CATH 09/06/16  LM: mild calcification, luminals  LAD: tortuous, long mid LAD myocardial bridge with mild   LCX: tortuous, luminals  RCA: Tortous, angulated takeoff with some catheter induced spasm at ostium     LVEDP: 22-25  LVEF: 65%  No significant MR seen     After PVC: LV systolic pressure did reach 220mmHg  LV: 183/6 (LVEDP 22-25)  Pt did not tolerate LV catheters due to ventricular ectopy which limited study, Not able to due dual pressure study     PLAN  1. No significant CAD (luminals only left main and prox LAD)  2. RCA spasm due to cathether  3. LVEF 65% with inability to perform LV pullback gradient or dual LV/Ao pressure study due to VT/hypotension  4. Will consult EP for VT.     Echo: 02/15/19  There is hyperdynamic LV systolic function with an estimated ejection   fraction of >65%.   No regional wall motion abnormalities are seen.   Left ventricular diastolic filling pressure are indeterminate.   Mild aortic regurgitation.   Mild mitral and tricuspid regurgitation.   Systolic pulmonary artery pressure (SPAP) is normal and estimated at 30 mmHg   (right atrial pressure 3 mmHg).   Pacer / ICD wire is visualized in the right ventricle.   No change from last echo on 11/29/2017    Echo 2/19/19   Summary   There is hyperdynamic LV systolic function with an estimated ejection   fraction of >65%.   No regional wall motion abnormalities are seen.   Left ventricular diastolic filling pressure are indeterminate.   Mild aortic regurgitation.   Mild mitral and tricuspid regurgitation.   Systolic pulmonary artery pressure (SPAP) is normal and estimated at 30 mmHg   (right atrial pressure 3 mmHg).

## 2019-08-19 NOTE — PROGRESS NOTES
Gilles Jose MD   vitamin D (CHOLECALCIFEROL) 1000 UNIT TABS tablet Take 1,000 Units by mouth daily   Yes Historical Provider, MD   lisinopril (PRINIVIL;ZESTRIL) 2.5 MG tablet TAKE 1 TABLET BY MOUTH EVERY DAY 2/6/19  Yes MESERET Caceres CNP   metoprolol tartrate (LOPRESSOR) 50 MG tablet TAKE 1 AND 1/2 TABLETS BY MOUTH TWICE DAILY 2/6/19  Yes MESERET Caceres CNP   ASPIRIN LOW DOSE 81 MG EC tablet TAKE 1 TABLET BY MOUTH EVERY DAY 8/6/18  Yes Gilles Jose MD   latanoprost (XALATAN) 0.005 % ophthalmic solution INSTILL 1 DROP IN BOTH EYES HS 5/14/17  Yes Historical Provider, MD   verapamil (CALAN) 120 MG tablet Take 0.5 tablets by mouth 2 times daily 9/9/16  Yes Cami Sellers MD   sertraline (ZOLOFT) 100 MG tablet Take 150 mg by mouth daily   Yes Historical Provider, MD   furosemide (LASIX) 40 MG tablet Take 0.5 tablets by mouth daily 7/22/19   MESERET Clark CNP   pravastatin (PRAVACHOL) 40 MG tablet TAKE 1 TABLET EVERY DAY 7/16/19   Alta [place], APRN - CNP   Multiple Vitamins-Minerals (THERAPEUTIC MULTIVITAMIN-MINERALS) tablet Take 1 tablet by mouth daily    Historical Provider, MD        Allergies:  Patient has no known allergies. Review of Systems:   · Constitutional: there has been no unanticipated weight loss. · Eyes: No vision changes  · ENT: No Headaches, no nasal congestion. No mouth sores or sore throat. · Cardiovascular: Reviewed in HPI  · Respiratory: No cough or wheezing, no sputum production. · Gastrointestinal: No abdominal pain, no constipation or diarrhea  · Genitourinary: No dysuria, trouble voiding, or hematuria. · Musculoskeletal:  No weakness or joint complaints. · Integumentary: No rash or pruritis. · Neurological: No numbness or tingling. No weakness. No tremor. · Psychiatric: No anxiety, no depression. · Endocrine:  No excessive thirst or urination.   · Hematologic/Lymphatic: No abnormal bruising or bleeding, blood clots or swollen lymph

## 2019-09-10 NOTE — TELEPHONE ENCOUNTER
----- Message from MESERET Wasserman CNP sent at 9/9/2019  8:18 AM EDT -----  Please call patient, needs to stop potassium supplement. Repeat BMP by the end of the week.

## 2019-12-10 ENCOUNTER — TELEPHONE (OUTPATIENT)
Dept: CARDIOLOGY CLINIC | Age: 84
End: 2019-12-10
